# Patient Record
Sex: MALE | Race: WHITE | NOT HISPANIC OR LATINO | Employment: FULL TIME | URBAN - NONMETROPOLITAN AREA
[De-identification: names, ages, dates, MRNs, and addresses within clinical notes are randomized per-mention and may not be internally consistent; named-entity substitution may affect disease eponyms.]

---

## 2017-12-18 ENCOUNTER — CONSULT (OUTPATIENT)
Dept: GASTROENTEROLOGY | Facility: CLINIC | Age: 27
End: 2017-12-18

## 2017-12-18 ENCOUNTER — LAB (OUTPATIENT)
Dept: LAB | Facility: HOSPITAL | Age: 27
End: 2017-12-18

## 2017-12-18 ENCOUNTER — TELEPHONE (OUTPATIENT)
Dept: GASTROENTEROLOGY | Facility: CLINIC | Age: 27
End: 2017-12-18

## 2017-12-18 ENCOUNTER — HOSPITAL ENCOUNTER (OUTPATIENT)
Dept: GENERAL RADIOLOGY | Facility: HOSPITAL | Age: 27
Discharge: HOME | End: 2017-12-18
Admitting: PHYSICIAN ASSISTANT

## 2017-12-18 VITALS
SYSTOLIC BLOOD PRESSURE: 136 MMHG | OXYGEN SATURATION: 96 % | BODY MASS INDEX: 29.85 KG/M2 | DIASTOLIC BLOOD PRESSURE: 87 MMHG | HEIGHT: 74 IN | HEART RATE: 74 BPM | WEIGHT: 232.6 LBS

## 2017-12-18 DIAGNOSIS — Z87.19 HISTORY OF PANCREATITIS: ICD-10-CM

## 2017-12-18 DIAGNOSIS — R19.4 CHANGE IN BOWEL HABITS: ICD-10-CM

## 2017-12-18 DIAGNOSIS — R10.11 RUQ ABDOMINAL PAIN: ICD-10-CM

## 2017-12-18 DIAGNOSIS — R10.11 RUQ ABDOMINAL PAIN: Primary | ICD-10-CM

## 2017-12-18 DIAGNOSIS — R11.0 NAUSEA: ICD-10-CM

## 2017-12-18 LAB
ALBUMIN SERPL-MCNC: 4.5 G/DL (ref 3.5–5)
ALBUMIN/GLOB SERPL: 1.7 G/DL (ref 1.5–2.5)
ALP SERPL-CCNC: 41 U/L (ref 40–129)
ALT SERPL W P-5'-P-CCNC: 100 U/L (ref 10–44)
AMYLASE SERPL-CCNC: 99 U/L (ref 28–100)
ANION GAP SERPL CALCULATED.3IONS-SCNC: 4.2 MMOL/L (ref 3.6–11.2)
AST SERPL-CCNC: 31 U/L (ref 10–34)
BASOPHILS # BLD AUTO: 0.03 10*3/MM3 (ref 0–0.3)
BASOPHILS NFR BLD AUTO: 0.5 % (ref 0–2)
BILIRUB SERPL-MCNC: 0.6 MG/DL (ref 0.2–1.8)
BUN BLD-MCNC: 11 MG/DL (ref 7–21)
BUN/CREAT SERPL: 11.3 (ref 7–25)
CALCIUM SPEC-SCNC: 9.7 MG/DL (ref 7.7–10)
CHLORIDE SERPL-SCNC: 107 MMOL/L (ref 99–112)
CO2 SERPL-SCNC: 31.8 MMOL/L (ref 24.3–31.9)
CREAT BLD-MCNC: 0.97 MG/DL (ref 0.43–1.29)
CRP SERPL-MCNC: <0.5 MG/DL (ref 0–0.99)
DEPRECATED RDW RBC AUTO: 38.5 FL (ref 37–54)
EOSINOPHIL # BLD AUTO: 0.11 10*3/MM3 (ref 0–0.7)
EOSINOPHIL NFR BLD AUTO: 1.7 % (ref 0–5)
ERYTHROCYTE [DISTWIDTH] IN BLOOD BY AUTOMATED COUNT: 12.7 % (ref 11.5–14.5)
GFR SERPL CREATININE-BSD FRML MDRD: 92 ML/MIN/1.73
GLOBULIN UR ELPH-MCNC: 2.6 GM/DL
GLUCOSE BLD-MCNC: 103 MG/DL (ref 70–110)
HCT VFR BLD AUTO: 46.6 % (ref 42–52)
HGB BLD-MCNC: 16.3 G/DL (ref 14–18)
IMM GRANULOCYTES # BLD: 0.01 10*3/MM3 (ref 0–0.03)
IMM GRANULOCYTES NFR BLD: 0.2 % (ref 0–0.5)
LIPASE SERPL-CCNC: 86 U/L (ref 13–60)
LYMPHOCYTES # BLD AUTO: 1.67 10*3/MM3 (ref 1–3)
LYMPHOCYTES NFR BLD AUTO: 26.3 % (ref 21–51)
MCH RBC QN AUTO: 29.6 PG (ref 27–33)
MCHC RBC AUTO-ENTMCNC: 35 G/DL (ref 33–37)
MCV RBC AUTO: 84.6 FL (ref 80–94)
MONOCYTES # BLD AUTO: 0.56 10*3/MM3 (ref 0.1–0.9)
MONOCYTES NFR BLD AUTO: 8.8 % (ref 0–10)
NEUTROPHILS # BLD AUTO: 3.97 10*3/MM3 (ref 1.4–6.5)
NEUTROPHILS NFR BLD AUTO: 62.5 % (ref 30–70)
OSMOLALITY SERPL CALC.SUM OF ELEC: 284.6 MOSM/KG (ref 273–305)
PLATELET # BLD AUTO: 248 10*3/MM3 (ref 130–400)
PMV BLD AUTO: 10.5 FL (ref 6–10)
POTASSIUM BLD-SCNC: 4.5 MMOL/L (ref 3.5–5.3)
PROT SERPL-MCNC: 7.1 G/DL (ref 6–8)
RBC # BLD AUTO: 5.51 10*6/MM3 (ref 4.7–6.1)
SODIUM BLD-SCNC: 143 MMOL/L (ref 135–153)
WBC NRBC COR # BLD: 6.35 10*3/MM3 (ref 4.5–12.5)

## 2017-12-18 PROCEDURE — 83690 ASSAY OF LIPASE: CPT

## 2017-12-18 PROCEDURE — 80053 COMPREHEN METABOLIC PANEL: CPT

## 2017-12-18 PROCEDURE — 74020 HC XR ABDOMEN FLAT & UPRIGHT: CPT

## 2017-12-18 PROCEDURE — 82150 ASSAY OF AMYLASE: CPT

## 2017-12-18 PROCEDURE — 36415 COLL VENOUS BLD VENIPUNCTURE: CPT

## 2017-12-18 PROCEDURE — 74020 XR ABDOMEN FLAT AND UPRIGHT: CPT | Performed by: RADIOLOGY

## 2017-12-18 PROCEDURE — 99214 OFFICE O/P EST MOD 30 MIN: CPT | Performed by: PHYSICIAN ASSISTANT

## 2017-12-18 PROCEDURE — 86140 C-REACTIVE PROTEIN: CPT

## 2017-12-18 PROCEDURE — 85025 COMPLETE CBC W/AUTO DIFF WBC: CPT

## 2017-12-18 NOTE — PROGRESS NOTES
": 1990    Chief Complaint   Patient presents with   • Abdominal Pain     \"pancreatic issues\"       Kendrick Fortune is a 27 y.o. male who presents to the office today as a consultation from Dr. Kasi Grajeda for evaluation of Abdominal Pain.    History of Present Illness:  He has been having RUQ abdominal pain intermittently over the past 2 years. Sometimes, this pain will move into the RLQ. Pain is described as sharp, dull and achy at times. The pain will be very brief at times. If he pushes on the area, it will help to relieve it. Drinking even 1 beer will cause discomfort in the area. Pain and diarrhea or constipation are usually present the following day. He had pancreatitis approx 2 years ago and this was present prior to his appendectomy. He has history of daily drinking for approx 2 years about 6 years ago. He also has history of using health supplements. He no longer drinks daily. Pancreatic enzymes were reported as mildly elevated recently. He had US of abd approx 2 years ago which was normal.     Bowel movements are fluctuating. \"Never the same.\" He will have diarrhea fluctuating with constipation with several skip days between. No rectal bleeding or melena. He was asked to take Protonix daily and was only able to take it for 4 days. He had abdominal pain and diarrhea with it, so he stopped. Denies nausea and vomiting. Heartburn is present and severe if he eats. If he skips breakfast, he will have heartburn and nausea. Sometimes, he will have acid reflux into his mouth if he bends or belches. No recent weight loss.     2014 abd film:  IMPRESSION-   1.  There is a moderate to large amount of stool in the colon.  2.  The lungs are clear.  3.  No evidence of bowel obstruction.  4.  No free air.      US gb 2014:  IMPRESSION-    Gallbladder contracted. No evidence of cholelithiasis.    Review of Systems   Constitutional: Negative for chills, fatigue and fever.   HENT: Negative for congestion, sore " throat, trouble swallowing and voice change.    Eyes: Negative for pain and visual disturbance.   Respiratory: Negative for cough, shortness of breath and wheezing.    Cardiovascular: Negative for chest pain, palpitations and leg swelling.   Gastrointestinal: Positive for abdominal distention, abdominal pain, constipation and diarrhea. Negative for anal bleeding, blood in stool, nausea, rectal pain and vomiting.   Endocrine: Negative for cold intolerance and heat intolerance.   Genitourinary: Negative for difficulty urinating and frequency.   Musculoskeletal: Negative for arthralgias, back pain and myalgias.   Skin: Negative for rash and wound.   Allergic/Immunologic: Positive for environmental allergies. Negative for food allergies.   Neurological: Negative for dizziness and headaches.   Hematological: Does not bruise/bleed easily.   Psychiatric/Behavioral: Positive for sleep disturbance. The patient is not nervous/anxious.        Past Medical History:   Diagnosis Date   • Pancreatitis        Past Surgical History:   Procedure Laterality Date   • ADENOIDECTOMY     • APPENDECTOMY     • COLONOSCOPY      age 10- normal   • UPPER GASTROINTESTINAL ENDOSCOPY      age 10- normal       Family History   Problem Relation Age of Onset   • Cancer Other    • Ovarian cancer Mother    • No Known Problems Father    • No Known Problems Sister    • Asthma Brother        Social History     Social History   • Marital status: Single     Spouse name: N/A   • Number of children: N/A   • Years of education: N/A     Social History Main Topics   • Smoking status: Current Every Day Smoker     Types: Electronic Cigarette   • Smokeless tobacco: Never Used   • Alcohol use Yes      Comment: occasional   • Drug use: Defer   • Sexual activity: Not Asked     Other Topics Concern   • None     Social History Narrative   • None     Medications:  None    Allergies:   Review of patient's allergies indicates no known allergies.    Vitals:  /87   "Pulse 74  Ht 188 cm (74\")  Wt 106 kg (232 lb 9.6 oz)  SpO2 96%  BMI 29.86 kg/m2    Physical Exam   Constitutional: He is oriented to person, place, and time. He appears well-developed and well-nourished. No distress.   HENT:   Head: Normocephalic and atraumatic.   Right Ear: External ear normal.   Left Ear: External ear normal.   Nose: Nose normal.   Mouth/Throat: Oropharynx is clear and moist.   Eyes: Conjunctivae and EOM are normal. Right eye exhibits no discharge. Left eye exhibits no discharge. No scleral icterus.   Neck: Normal range of motion. Neck supple.   Cardiovascular: Normal rate, regular rhythm and normal heart sounds.  Exam reveals no gallop and no friction rub.    No murmur heard.  Pulmonary/Chest: Effort normal and breath sounds normal. No respiratory distress. He has no wheezes. He has no rales. He exhibits no tenderness.   Abdominal: Soft. Normal appearance and bowel sounds are normal. He exhibits no distension, no ascites and no mass. There is tenderness (mild RUQ). There is no rigidity and no guarding. No hernia.   Musculoskeletal: Normal range of motion. He exhibits no edema or deformity.   Neurological: He is alert and oriented to person, place, and time. He exhibits normal muscle tone. Coordination normal.   Skin: Skin is warm and dry. No rash noted. No erythema. No pallor.   Psychiatric: He has a normal mood and affect. His behavior is normal. Judgment and thought content normal.   Nursing note and vitals reviewed.      Assessment/Plan:  1. RUQ abdominal pain    2. Change in bowel habits    3. History of pancreatitis    4. Nausea        Orders Placed This Encounter   Procedures   • XR Abdomen Flat & Upright   • US Abdomen Complete   • Amylase   • Comprehensive Metabolic Panel   • C-reactive Protein   • Lipase   • CBC & Differential     After careful review of previous evaluation for abdominal pain, it is not certain that he had true pancreatitis as mentioned in his history. Pancreatic " elevations have been present but not in the usual trend of pancreatitis and there has not been any imaging to support this diagnosis yet. He does have symptoms when he has any amount of alcohol, but those could be due to other problems, ie ulcers. He will have the aforementioned tests and further recommendations will be made based on those results.     He will need an esophagogastroduodenoscopy performed with IV general sedation. All of the risks, benefits and alternatives of this procedure have been discussed with him, all of his questions have been answered and he has elected to proceed. He should follow up in the office after this procedure to discuss the results and further recommendations can be made at that time.        Return in about 2 weeks (around 1/1/2018) for recheck abdominal pain.      Electronically signed 12/18/2017 9:43 PM  Jesusita Rodriguez PA-C, [facility]

## 2017-12-18 NOTE — TELEPHONE ENCOUNTER
Gypsy spoke with patients PCP office to get passport specialty referral. They stated that theyw ould fax it over. They faxed over the referral but not the specialty referral. I called back and requested it again. They stated that they would fax it over.

## 2017-12-19 PROBLEM — R19.4 CHANGE IN BOWEL HABITS: Status: ACTIVE | Noted: 2017-12-19

## 2017-12-19 PROBLEM — R10.11 RUQ ABDOMINAL PAIN: Status: ACTIVE | Noted: 2017-12-19

## 2017-12-19 PROBLEM — R11.0 NAUSEA: Status: ACTIVE | Noted: 2017-12-19

## 2017-12-19 PROBLEM — Z87.19 HISTORY OF PANCREATITIS: Status: ACTIVE | Noted: 2017-12-19

## 2017-12-21 ENCOUNTER — TELEPHONE (OUTPATIENT)
Dept: GASTROENTEROLOGY | Facility: CLINIC | Age: 27
End: 2017-12-21

## 2017-12-22 ENCOUNTER — HOSPITAL ENCOUNTER (OUTPATIENT)
Dept: ULTRASOUND IMAGING | Facility: HOSPITAL | Age: 27
Discharge: HOME | End: 2017-12-22
Admitting: PHYSICIAN ASSISTANT

## 2017-12-22 DIAGNOSIS — Z87.19 HISTORY OF PANCREATITIS: ICD-10-CM

## 2017-12-22 DIAGNOSIS — R11.0 NAUSEA: ICD-10-CM

## 2017-12-22 DIAGNOSIS — R19.4 CHANGE IN BOWEL HABITS: ICD-10-CM

## 2017-12-22 DIAGNOSIS — R10.11 RUQ ABDOMINAL PAIN: ICD-10-CM

## 2017-12-22 PROCEDURE — 76700 US EXAM ABDOM COMPLETE: CPT

## 2017-12-22 PROCEDURE — 76700 US EXAM ABDOM COMPLETE: CPT | Performed by: RADIOLOGY

## 2017-12-29 ENCOUNTER — APPOINTMENT (OUTPATIENT)
Dept: ULTRASOUND IMAGING | Facility: HOSPITAL | Age: 27
End: 2017-12-29

## 2018-01-15 ENCOUNTER — OFFICE VISIT (OUTPATIENT)
Dept: GASTROENTEROLOGY | Facility: CLINIC | Age: 28
End: 2018-01-15

## 2018-01-15 VITALS
DIASTOLIC BLOOD PRESSURE: 87 MMHG | HEIGHT: 74 IN | BODY MASS INDEX: 30.26 KG/M2 | OXYGEN SATURATION: 97 % | HEART RATE: 69 BPM | WEIGHT: 235.8 LBS | SYSTOLIC BLOOD PRESSURE: 133 MMHG

## 2018-01-15 DIAGNOSIS — R19.4 CHANGE IN BOWEL HABITS: ICD-10-CM

## 2018-01-15 DIAGNOSIS — R74.01 ELEVATED ALT MEASUREMENT: Primary | ICD-10-CM

## 2018-01-15 DIAGNOSIS — R74.8 ELEVATED LIPASE: ICD-10-CM

## 2018-01-15 DIAGNOSIS — K59.00 CONSTIPATION, UNSPECIFIED CONSTIPATION TYPE: ICD-10-CM

## 2018-01-15 DIAGNOSIS — R10.11 RUQ ABDOMINAL PAIN: ICD-10-CM

## 2018-01-15 PROCEDURE — 99214 OFFICE O/P EST MOD 30 MIN: CPT | Performed by: PHYSICIAN ASSISTANT

## 2018-01-15 RX ORDER — POLYETHYLENE GLYCOL 3350 17 G/17G
17 POWDER, FOR SOLUTION ORAL EVERY OTHER DAY
Qty: 510 G | Refills: 2 | Status: SHIPPED | OUTPATIENT
Start: 2018-01-15 | End: 2018-04-17 | Stop reason: SDUPTHER

## 2018-01-15 NOTE — PROGRESS NOTES
": 1990    Chief Complaint   Patient presents with   • Abdominal Pain       Kendrick Fortune is a 27 y.o. male who presents to the office today as a follow up appointment regarding Abdominal Pain.      History of Present Illness:  Abdominal pain unchanged. Still present in the RUQ and intermittent. He feels bloated today. This abdominal pain and fluctuation of bowel habits has been ongoing for 2 years. EGD is planned in 2 weeks. Worse with italian foods and with any alcohol intake. He reports that the pain is usually the worst 2 days after ingestion of these. He has \"flare ups\" in which the diarrhea is worse. Stools are described as loose at times. He will have a bowel movement every day but it is not always a large quantity.     US abdomen complete on 2017- normal  Abdominal film on 2017- Slight increased stool volume in the right colon    Labs 2017:  Lipase 86 elev  Amylase 99 normal  CRP normal  CBC normal  CMP normal other than elev of ALT at 100    Previous History:  Daily drinking for approx 2 years about 6 years ago. He also has history of using health supplements. He no longer drinks daily. Pancreatic enzymes were elevated in the past and he was told that he had pancreatitis.      He has taken Protonix daily and was only able to take it for 4 days. He had abdominal pain and diarrhea with it, so he stopped. Denies nausea and vomiting. Heartburn is present and severe if he eats. If he skips breakfast, he will have heartburn and nausea. Sometimes, he will have acid reflux into his mouth if he bends or belches. No recent weight loss.      2014 abd film:  1.  There is a moderate to large amount of stool in the colon.  2.  The lungs are clear.  3.  No evidence of bowel obstruction.  4.  No free air.      US gb 2014- Gallbladder contracted. No evidence of cholelithiasis.    Review of Systems   Constitutional: Negative for chills, fatigue and fever.   HENT: Negative for congestion, sore " throat, trouble swallowing and voice change.    Eyes: Negative for pain and visual disturbance.   Respiratory: Negative for cough, shortness of breath and wheezing.    Cardiovascular: Negative for chest pain, palpitations and leg swelling.   Gastrointestinal: Positive for abdominal distention and abdominal pain. Negative for anal bleeding, blood in stool, constipation, diarrhea, nausea, rectal pain and vomiting.   Endocrine: Negative for cold intolerance and heat intolerance.   Genitourinary: Negative for difficulty urinating and frequency.   Musculoskeletal: Negative for arthralgias, back pain and myalgias.   Skin: Negative for rash and wound.   Allergic/Immunologic: Positive for environmental allergies. Negative for food allergies.   Neurological: Negative for dizziness and headaches.   Hematological: Does not bruise/bleed easily.   Psychiatric/Behavioral: Positive for sleep disturbance. The patient is not nervous/anxious.        Past Medical History:   Diagnosis Date   • Pancreatitis        Past Surgical History:   Procedure Laterality Date   • ADENOIDECTOMY     • APPENDECTOMY     • COLONOSCOPY      age 10- normal   • UPPER GASTROINTESTINAL ENDOSCOPY      age 10- normal       Family History   Problem Relation Age of Onset   • Cancer Other    • Ovarian cancer Mother    • No Known Problems Father    • No Known Problems Sister    • Asthma Brother        Social History     Social History   • Marital status: Single     Spouse name: N/A   • Number of children: N/A   • Years of education: N/A     Social History Main Topics   • Smoking status: Current Every Day Smoker     Types: Electronic Cigarette   • Smokeless tobacco: Never Used   • Alcohol use Yes      Comment: occasional   • Drug use: Defer   • Sexual activity: Not on file     Social History Narrative   • 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    Vitals:  /87   "Pulse 69  Ht 188 cm (74\")  Wt 107 kg (235 lb 12.8 oz)  SpO2 97%  BMI 30.27 kg/m2    Physical Exam   Constitutional: He is oriented to person, place, and time. He appears well-developed and well-nourished. No distress.   HENT:   Head: Normocephalic and atraumatic.   Right Ear: External ear normal.   Left Ear: External ear normal.   Nose: Nose normal.   Mouth/Throat: Oropharynx is clear and moist.   Eyes: Conjunctivae and EOM are normal. Right eye exhibits no discharge. Left eye exhibits no discharge. No scleral icterus.   Neck: Normal range of motion. Neck supple.   Cardiovascular: Normal rate, regular rhythm and normal heart sounds.  Exam reveals no gallop and no friction rub.    No murmur heard.  Pulmonary/Chest: Effort normal and breath sounds normal. No respiratory distress. He has no wheezes. He has no rales. He exhibits no tenderness.   Abdominal: Soft. Normal appearance and bowel sounds are normal. He exhibits no distension, no ascites and no mass. There is tenderness (bilateral lower quadrants, mild). There is no rigidity and no guarding. No hernia.   Musculoskeletal: Normal range of motion. He exhibits no edema or deformity.   Neurological: He is alert and oriented to person, place, and time. He exhibits normal muscle tone. Coordination normal.   Skin: Skin is warm and dry. No rash noted. No erythema. No pallor.   Psychiatric: He has a normal mood and affect. His behavior is normal. Judgment and thought content normal.   Nursing note and vitals reviewed.      Assessment/Plan:  1. Elevated ALT measurement    2. Elevated lipase    3. RUQ abdominal pain    4. Change in bowel habits    5. Constipation, unspecified constipation type      Orders Placed This Encounter   Procedures   • NM HIDA Scan With Pharmacological Intervention     New Medications Ordered This Visit   Medications   • magnesium citrate solution     Sig: Take 296 mL by mouth Take As Directed. Take 1 bottle now than 2nd bottle approx 6 hours " after for clean out.     Dispense:  592 mL     Refill:  0   • polyethylene glycol (MIRALAX) powder     Sig: Take 17 g by mouth Every Other Day.     Dispense:  510 g     Refill:  2     EGD already scheduled for 2/2018 with Dr. Ashraf. He will have HIDA scan due to complaints as well. With right sided constipation noted on abdominal film and continuous RUQ abdominal pain, I have recommended clean out with magnesium citrate then begin Miralax 17 g every other day. He agrees. All results were discussed at today's appointment. Lipase mildly elevated, ALT at 100. US abd normal. No specific etiology of lab abnormalities known at this time.         Return for follow up after procedure to discuss results.      Electronically signed 1/15/2018 12:30 PM  Jesusita Rodriguez PA-C, [facility]

## 2018-01-18 ENCOUNTER — HOSPITAL ENCOUNTER (OUTPATIENT)
Dept: NUCLEAR MEDICINE | Facility: HOSPITAL | Age: 28
Discharge: HOME | End: 2018-01-18

## 2018-01-18 ENCOUNTER — TELEPHONE (OUTPATIENT)
Dept: GASTROENTEROLOGY | Facility: CLINIC | Age: 28
End: 2018-01-18

## 2018-01-18 DIAGNOSIS — R10.11 RUQ ABDOMINAL PAIN: ICD-10-CM

## 2018-01-18 DIAGNOSIS — R19.4 CHANGE IN BOWEL HABITS: ICD-10-CM

## 2018-01-18 PROCEDURE — 0 TECHNETIUM TC 99M MEBROFENIN KIT: Performed by: PHYSICIAN ASSISTANT

## 2018-01-18 PROCEDURE — 25010000002 SINCALIDE PER 5 MCG: Performed by: PHYSICIAN ASSISTANT

## 2018-01-18 PROCEDURE — 78227 HEPATOBIL SYST IMAGE W/DRUG: CPT | Performed by: RADIOLOGY

## 2018-01-18 PROCEDURE — 78227 HEPATOBIL SYST IMAGE W/DRUG: CPT

## 2018-01-18 PROCEDURE — A9537 TC99M MEBROFENIN: HCPCS | Performed by: PHYSICIAN ASSISTANT

## 2018-01-18 RX ORDER — KIT FOR THE PREPARATION OF TECHNETIUM TC 99M MEBROFENIN 45 MG/10ML
1 INJECTION, POWDER, LYOPHILIZED, FOR SOLUTION INTRAVENOUS
Status: COMPLETED | OUTPATIENT
Start: 2018-01-18 | End: 2018-01-18

## 2018-01-18 RX ADMIN — SINCALIDE 4.3 MCG: 5 INJECTION, POWDER, LYOPHILIZED, FOR SOLUTION INTRAVENOUS at 10:00

## 2018-01-18 RX ADMIN — MEBROFENIN 1 DOSE: 45 INJECTION, POWDER, LYOPHILIZED, FOR SOLUTION INTRAVENOUS at 09:15

## 2018-01-31 ENCOUNTER — TELEPHONE (OUTPATIENT)
Dept: GASTROENTEROLOGY | Facility: CLINIC | Age: 28
End: 2018-01-31

## 2018-01-31 NOTE — TELEPHONE ENCOUNTER
[employer] insurance sent a letter (scanned into chart) saying that they were returning a claim because the group ID was missing or not in the correct format. I called PCP office and spoke with Marino. He stated that he would sent an updated specialty referral.

## 2018-02-01 ENCOUNTER — ANESTHESIA (OUTPATIENT)
Dept: PERIOP | Facility: HOSPITAL | Age: 28
End: 2018-02-01

## 2018-02-01 ENCOUNTER — TELEPHONE (OUTPATIENT)
Dept: GASTROENTEROLOGY | Facility: CLINIC | Age: 28
End: 2018-02-01

## 2018-02-01 ENCOUNTER — ANESTHESIA EVENT (OUTPATIENT)
Dept: PERIOP | Facility: HOSPITAL | Age: 28
End: 2018-02-01

## 2018-02-01 ENCOUNTER — HOSPITAL ENCOUNTER (OUTPATIENT)
Facility: HOSPITAL | Age: 28
Setting detail: HOSPITAL OUTPATIENT SURGERY
Discharge: HOME | End: 2018-02-01
Attending: INTERNAL MEDICINE | Admitting: INTERNAL MEDICINE

## 2018-02-01 VITALS
SYSTOLIC BLOOD PRESSURE: 123 MMHG | OXYGEN SATURATION: 98 % | BODY MASS INDEX: 29.77 KG/M2 | RESPIRATION RATE: 20 BRPM | WEIGHT: 232 LBS | DIASTOLIC BLOOD PRESSURE: 81 MMHG | HEIGHT: 74 IN | HEART RATE: 69 BPM | TEMPERATURE: 98 F

## 2018-02-01 DIAGNOSIS — R19.4 CHANGE IN BOWEL HABITS: ICD-10-CM

## 2018-02-01 DIAGNOSIS — R74.8 ELEVATED LIVER ENZYMES: Primary | ICD-10-CM

## 2018-02-01 DIAGNOSIS — R11.0 NAUSEA: ICD-10-CM

## 2018-02-01 DIAGNOSIS — R10.11 RUQ ABDOMINAL PAIN: ICD-10-CM

## 2018-02-01 DIAGNOSIS — Z87.19 HISTORY OF PANCREATITIS: ICD-10-CM

## 2018-02-01 LAB
ALBUMIN SERPL-MCNC: 4.6 G/DL (ref 3.5–5)
ALP SERPL-CCNC: 34 U/L (ref 40–129)
ALT SERPL W P-5'-P-CCNC: 88 U/L (ref 10–44)
AST SERPL-CCNC: 34 U/L (ref 10–34)
BILIRUB CONJ SERPL-MCNC: 0.3 MG/DL (ref 0–0.2)
BILIRUB INDIRECT SERPL-MCNC: 0.7 MG/DL
BILIRUB SERPL-MCNC: 1 MG/DL (ref 0.2–1.8)
PROT SERPL-MCNC: 7 G/DL (ref 6–8)

## 2018-02-01 PROCEDURE — 43239 EGD BIOPSY SINGLE/MULTIPLE: CPT | Performed by: INTERNAL MEDICINE

## 2018-02-01 PROCEDURE — 25010000002 FENTANYL CITRATE (PF) 100 MCG/2ML SOLUTION: Performed by: NURSE ANESTHETIST, CERTIFIED REGISTERED

## 2018-02-01 PROCEDURE — 80076 HEPATIC FUNCTION PANEL: CPT | Performed by: INTERNAL MEDICINE

## 2018-02-01 PROCEDURE — 25010000002 MIDAZOLAM PER 1 MG: Performed by: NURSE ANESTHETIST, CERTIFIED REGISTERED

## 2018-02-01 PROCEDURE — 25010000002 PROPOFOL 10 MG/ML EMULSION: Performed by: NURSE ANESTHETIST, CERTIFIED REGISTERED

## 2018-02-01 RX ORDER — ONDANSETRON 2 MG/ML
4 INJECTION INTRAMUSCULAR; INTRAVENOUS ONCE AS NEEDED
Status: DISCONTINUED | OUTPATIENT
Start: 2018-02-01 | End: 2018-02-01 | Stop reason: HOSPADM

## 2018-02-01 RX ORDER — FENTANYL CITRATE 50 UG/ML
50 INJECTION, SOLUTION INTRAMUSCULAR; INTRAVENOUS
Status: DISCONTINUED | OUTPATIENT
Start: 2018-02-01 | End: 2018-02-01 | Stop reason: HOSPADM

## 2018-02-01 RX ORDER — IPRATROPIUM BROMIDE AND ALBUTEROL SULFATE 2.5; .5 MG/3ML; MG/3ML
3 SOLUTION RESPIRATORY (INHALATION) ONCE AS NEEDED
Status: DISCONTINUED | OUTPATIENT
Start: 2018-02-01 | End: 2018-02-01 | Stop reason: HOSPADM

## 2018-02-01 RX ORDER — SODIUM CHLORIDE, SODIUM LACTATE, POTASSIUM CHLORIDE, CALCIUM CHLORIDE 600; 310; 30; 20 MG/100ML; MG/100ML; MG/100ML; MG/100ML
125 INJECTION, SOLUTION INTRAVENOUS CONTINUOUS
Status: DISCONTINUED | OUTPATIENT
Start: 2018-02-01 | End: 2018-02-01 | Stop reason: HOSPADM

## 2018-02-01 RX ORDER — SODIUM CHLORIDE 0.9 % (FLUSH) 0.9 %
1-10 SYRINGE (ML) INJECTION AS NEEDED
Status: DISCONTINUED | OUTPATIENT
Start: 2018-02-01 | End: 2018-02-01 | Stop reason: HOSPADM

## 2018-02-01 RX ORDER — PROPOFOL 10 MG/ML
VIAL (ML) INTRAVENOUS AS NEEDED
Status: DISCONTINUED | OUTPATIENT
Start: 2018-02-01 | End: 2018-02-01 | Stop reason: SURG

## 2018-02-01 RX ORDER — LIDOCAINE HYDROCHLORIDE 20 MG/ML
INJECTION, SOLUTION INFILTRATION; PERINEURAL AS NEEDED
Status: DISCONTINUED | OUTPATIENT
Start: 2018-02-01 | End: 2018-02-01 | Stop reason: SURG

## 2018-02-01 RX ORDER — MIDAZOLAM HYDROCHLORIDE 1 MG/ML
INJECTION INTRAMUSCULAR; INTRAVENOUS AS NEEDED
Status: DISCONTINUED | OUTPATIENT
Start: 2018-02-01 | End: 2018-02-01 | Stop reason: SURG

## 2018-02-01 RX ORDER — FENTANYL CITRATE 50 UG/ML
INJECTION, SOLUTION INTRAMUSCULAR; INTRAVENOUS AS NEEDED
Status: DISCONTINUED | OUTPATIENT
Start: 2018-02-01 | End: 2018-02-01 | Stop reason: SURG

## 2018-02-01 RX ADMIN — SODIUM CHLORIDE, POTASSIUM CHLORIDE, SODIUM LACTATE AND CALCIUM CHLORIDE: 600; 310; 30; 20 INJECTION, SOLUTION INTRAVENOUS at 10:17

## 2018-02-01 RX ADMIN — PROPOFOL 120 MG: 10 INJECTION, EMULSION INTRAVENOUS at 10:23

## 2018-02-01 RX ADMIN — MIDAZOLAM HYDROCHLORIDE 2 MG: 1 INJECTION, SOLUTION INTRAMUSCULAR; INTRAVENOUS at 10:16

## 2018-02-01 RX ADMIN — LIDOCAINE HYDROCHLORIDE 100 MG: 20 INJECTION, SOLUTION INFILTRATION; PERINEURAL at 10:23

## 2018-02-01 RX ADMIN — FENTANYL CITRATE 100 MCG: 50 INJECTION INTRAMUSCULAR; INTRAVENOUS at 10:16

## 2018-02-01 RX ADMIN — PROPOFOL 60 MG: 10 INJECTION, EMULSION INTRAVENOUS at 10:25

## 2018-02-01 NOTE — ANESTHESIA PREPROCEDURE EVALUATION
Anesthesia Evaluation     Patient summary reviewed and Nursing notes reviewed   no history of anesthetic complications:  NPO Solid Status: > 8 hours  NPO Liquid Status: > 8 hours     Airway   Mallampati: II  TM distance: >3 FB  Neck ROM: full  no difficulty expected  Dental - normal exam     Pulmonary - negative pulmonary ROS and normal exam   Cardiovascular - negative cardio ROS and normal exam        Neuro/Psych- negative ROS  GI/Hepatic/Renal/Endo - negative ROS     Musculoskeletal (-) negative ROS    Abdominal  - normal exam   Substance History - negative use     OB/GYN negative ob/gyn ROS         Other                                                Anesthesia Plan    ASA 1     general     intravenous induction   Anesthetic plan and risks discussed with patient.

## 2018-02-01 NOTE — ANESTHESIA POSTPROCEDURE EVALUATION
Patient: Kendrick Fortune    Procedure Summary     Date Anesthesia Start Anesthesia Stop Room / Location    02/01/18 1017 1030 [facility] 10 / [facility]       Procedure Diagnosis Surgeon Provider    ESOPHAGOGASTRODUODENOSCOPY WITH BIOPSY CPT CODE: 53836 (N/A Esophagus) Nausea; RUQ abdominal pain; History of pancreatitis; Change in bowel habits  (Nausea [R11.0]; RUQ abdominal pain [R10.11]; History of pancreatitis [Z87.19]; Change in bowel habits [R19.4]) MD Momo Mcdermott III, MD          Anesthesia Type: general  Last vitals  BP   134/85 (02/01/18 0901)   Temp   97.1 °F (36.2 °C) (02/01/18 0901)   Pulse   80 (02/01/18 0901)   Resp   20 (02/01/18 0901)     SpO2   100 % (02/01/18 0901)     Post Anesthesia Care and Evaluation    Patient location during evaluation: PHASE II  Patient participation: complete - patient participated  Level of consciousness: awake and alert  Pain score: 0  Pain management: adequate  Airway patency: patent  Anesthetic complications: No anesthetic complications  PONV Status: controlled  Cardiovascular status: acceptable  Respiratory status: acceptable and room air  Hydration status: euvolemic  No anesthesia care post op

## 2018-02-01 NOTE — OP NOTE
02/01/18    ESOPHAGOGASTRODUODENOSCOPY WITH BIOPSY  Procedure Note    Kendrick Fortune  2/1/2018    Pre-op Diagnosis: RUQ pain, bloating      Post-op Diagnosis: Normal EGD        Anesthesia: Per Anesthesia service, general anesthesia      Estimated Blood Loss: Negligible      Findings: EGD was performed with careful examination of the esophagus, stomach and duodenum to the fourth portion.  All areas appeared normal.  Biopsies were taken randomly from the gastric antrum in order to check for H. pylori.  Duodenal biopsies were obtained in order to screen for occult small bowel mucosal disease.  He tolerated the procedure very well and no complications were encountered.  He left the OR in stable and satisfactory condition.      Complications: None      Recommendations:   Findings were discussed with the patient  Await biopsy findings      Jeovany Ashraf III, MD     Date: 2/1/2018  Time: 10:34 AM     CC-Dr. Kasi Grajeda

## 2018-02-01 NOTE — H&P (VIEW-ONLY)
": 1990    Chief Complaint   Patient presents with   • Abdominal Pain       Kendrick Fortune is a 27 y.o. male who presents to the office today as a follow up appointment regarding Abdominal Pain.      History of Present Illness:  Abdominal pain unchanged. Still present in the RUQ and intermittent. He feels bloated today. This abdominal pain and fluctuation of bowel habits has been ongoing for 2 years. EGD is planned in 2 weeks. Worse with italian foods and with any alcohol intake. He reports that the pain is usually the worst 2 days after ingestion of these. He has \"flare ups\" in which the diarrhea is worse. Stools are described as loose at times. He will have a bowel movement every day but it is not always a large quantity.     US abdomen complete on 2017- normal  Abdominal film on 2017- Slight increased stool volume in the right colon    Labs 2017:  Lipase 86 elev  Amylase 99 normal  CRP normal  CBC normal  CMP normal other than elev of ALT at 100    Previous History:  Daily drinking for approx 2 years about 6 years ago. He also has history of using health supplements. He no longer drinks daily. Pancreatic enzymes were elevated in the past and he was told that he had pancreatitis.      He has taken Protonix daily and was only able to take it for 4 days. He had abdominal pain and diarrhea with it, so he stopped. Denies nausea and vomiting. Heartburn is present and severe if he eats. If he skips breakfast, he will have heartburn and nausea. Sometimes, he will have acid reflux into his mouth if he bends or belches. No recent weight loss.      2014 abd film:  1.  There is a moderate to large amount of stool in the colon.  2.  The lungs are clear.  3.  No evidence of bowel obstruction.  4.  No free air.      US gb 2014- Gallbladder contracted. No evidence of cholelithiasis.    Review of Systems   Constitutional: Negative for chills, fatigue and fever.   HENT: Negative for congestion, sore " throat, trouble swallowing and voice change.    Eyes: Negative for pain and visual disturbance.   Respiratory: Negative for cough, shortness of breath and wheezing.    Cardiovascular: Negative for chest pain, palpitations and leg swelling.   Gastrointestinal: Positive for abdominal distention and abdominal pain. Negative for anal bleeding, blood in stool, constipation, diarrhea, nausea, rectal pain and vomiting.   Endocrine: Negative for cold intolerance and heat intolerance.   Genitourinary: Negative for difficulty urinating and frequency.   Musculoskeletal: Negative for arthralgias, back pain and myalgias.   Skin: Negative for rash and wound.   Allergic/Immunologic: Positive for environmental allergies. Negative for food allergies.   Neurological: Negative for dizziness and headaches.   Hematological: Does not bruise/bleed easily.   Psychiatric/Behavioral: Positive for sleep disturbance. The patient is not nervous/anxious.        Past Medical History:   Diagnosis Date   • Pancreatitis        Past Surgical History:   Procedure Laterality Date   • ADENOIDECTOMY     • APPENDECTOMY     • COLONOSCOPY      age 10- normal   • UPPER GASTROINTESTINAL ENDOSCOPY      age 10- normal       Family History   Problem Relation Age of Onset   • Cancer Other    • Ovarian cancer Mother    • No Known Problems Father    • No Known Problems Sister    • Asthma Brother        Social History     Social History   • Marital status: Single     Spouse name: N/A   • Number of children: N/A   • Years of education: N/A     Social History Main Topics   • Smoking status: Current Every Day Smoker     Types: Electronic Cigarette   • Smokeless tobacco: Never Used   • Alcohol use Yes      Comment: occasional   • Drug use: Defer   • Sexual activity: Not on file     Social History Narrative   • 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    Vitals:  /87   "Pulse 69  Ht 188 cm (74\")  Wt 107 kg (235 lb 12.8 oz)  SpO2 97%  BMI 30.27 kg/m2    Physical Exam   Constitutional: He is oriented to person, place, and time. He appears well-developed and well-nourished. No distress.   HENT:   Head: Normocephalic and atraumatic.   Right Ear: External ear normal.   Left Ear: External ear normal.   Nose: Nose normal.   Mouth/Throat: Oropharynx is clear and moist.   Eyes: Conjunctivae and EOM are normal. Right eye exhibits no discharge. Left eye exhibits no discharge. No scleral icterus.   Neck: Normal range of motion. Neck supple.   Cardiovascular: Normal rate, regular rhythm and normal heart sounds.  Exam reveals no gallop and no friction rub.    No murmur heard.  Pulmonary/Chest: Effort normal and breath sounds normal. No respiratory distress. He has no wheezes. He has no rales. He exhibits no tenderness.   Abdominal: Soft. Normal appearance and bowel sounds are normal. He exhibits no distension, no ascites and no mass. There is tenderness (bilateral lower quadrants, mild). There is no rigidity and no guarding. No hernia.   Musculoskeletal: Normal range of motion. He exhibits no edema or deformity.   Neurological: He is alert and oriented to person, place, and time. He exhibits normal muscle tone. Coordination normal.   Skin: Skin is warm and dry. No rash noted. No erythema. No pallor.   Psychiatric: He has a normal mood and affect. His behavior is normal. Judgment and thought content normal.   Nursing note and vitals reviewed.      Assessment/Plan:  1. Elevated ALT measurement    2. Elevated lipase    3. RUQ abdominal pain    4. Change in bowel habits    5. Constipation, unspecified constipation type      Orders Placed This Encounter   Procedures   • NM HIDA Scan With Pharmacological Intervention     New Medications Ordered This Visit   Medications   • magnesium citrate solution     Sig: Take 296 mL by mouth Take As Directed. Take 1 bottle now than 2nd bottle approx 6 hours " after for clean out.     Dispense:  592 mL     Refill:  0   • polyethylene glycol (MIRALAX) powder     Sig: Take 17 g by mouth Every Other Day.     Dispense:  510 g     Refill:  2     EGD already scheduled for 2/2018 with Dr. Ashraf. He will have HIDA scan due to complaints as well. With right sided constipation noted on abdominal film and continuous RUQ abdominal pain, I have recommended clean out with magnesium citrate then begin Miralax 17 g every other day. He agrees. All results were discussed at today's appointment. Lipase mildly elevated, ALT at 100. US abd normal. No specific etiology of lab abnormalities known at this time.         Return for follow up after procedure to discuss results.      Electronically signed 1/15/2018 12:30 PM  Jesusita Rodriguez PA-C, [facility]

## 2018-02-01 NOTE — PLAN OF CARE
Problem: GI Endoscopy (Adult)  Goal: Signs and Symptoms of Listed Potential Problems Will be Absent or Manageable (GI Endoscopy)  Outcome: Ongoing (interventions implemented as appropriate)         (3) no apparent problem

## 2018-02-02 LAB
LAB AP CASE REPORT: NORMAL
Lab: NORMAL
PATH REPORT.FINAL DX SPEC: NORMAL

## 2018-02-05 ENCOUNTER — APPOINTMENT (OUTPATIENT)
Dept: LAB | Facility: HOSPITAL | Age: 28
End: 2018-02-05

## 2018-02-05 LAB
HAV IGM SERPL QL IA: NORMAL
HBV CORE IGM SERPL QL IA: NORMAL
HBV SURFACE AG SERPL QL IA: NORMAL
HCV AB SER DONR QL: NORMAL

## 2018-02-05 PROCEDURE — 36415 COLL VENOUS BLD VENIPUNCTURE: CPT | Performed by: INTERNAL MEDICINE

## 2018-02-05 PROCEDURE — 80074 ACUTE HEPATITIS PANEL: CPT | Performed by: INTERNAL MEDICINE

## 2018-02-06 ENCOUNTER — LAB (OUTPATIENT)
Dept: LAB | Facility: HOSPITAL | Age: 28
End: 2018-02-06

## 2018-02-06 ENCOUNTER — TELEPHONE (OUTPATIENT)
Dept: GASTROENTEROLOGY | Facility: CLINIC | Age: 28
End: 2018-02-06

## 2018-02-06 DIAGNOSIS — R74.01 ELEVATED ALT MEASUREMENT: Primary | ICD-10-CM

## 2018-02-06 DIAGNOSIS — R74.01 ELEVATED ALT MEASUREMENT: ICD-10-CM

## 2018-02-06 LAB
FERRITIN SERPL-MCNC: 313 NG/ML (ref 21.9–321.7)
IRON 24H UR-MRATE: 117 MCG/DL (ref 53–167)
IRON SATN MFR SERPL: 36 % (ref 20–50)
TIBC SERPL-MCNC: 326 MCG/DL (ref 241–421)

## 2018-02-06 PROCEDURE — 83516 IMMUNOASSAY NONANTIBODY: CPT

## 2018-02-06 PROCEDURE — 83550 IRON BINDING TEST: CPT

## 2018-02-06 PROCEDURE — 36415 COLL VENOUS BLD VENIPUNCTURE: CPT

## 2018-02-06 PROCEDURE — 86708 HEPATITIS A ANTIBODY: CPT

## 2018-02-06 PROCEDURE — 86038 ANTINUCLEAR ANTIBODIES: CPT

## 2018-02-06 PROCEDURE — 82390 ASSAY OF CERULOPLASMIN: CPT

## 2018-02-06 PROCEDURE — 83540 ASSAY OF IRON: CPT

## 2018-02-06 PROCEDURE — 82728 ASSAY OF FERRITIN: CPT

## 2018-02-06 PROCEDURE — 82784 ASSAY IGA/IGD/IGG/IGM EACH: CPT

## 2018-02-06 PROCEDURE — 82103 ALPHA-1-ANTITRYPSIN TOTAL: CPT

## 2018-02-06 PROCEDURE — 82104 ALPHA-1-ANTITRYPSIN PHENO: CPT

## 2018-02-06 PROCEDURE — 86255 FLUORESCENT ANTIBODY SCREEN: CPT

## 2018-02-06 NOTE — TELEPHONE ENCOUNTER
Please let patient know that I would like him to have labs prior to his next appt, (may take 1 week for labs to come back) and to have them as soon as he can. Thanks.

## 2018-02-07 LAB
ACTIN IGG SERPL-ACNC: 3 UNITS (ref 0–19)
ANA SER QL IA: NEGATIVE
CERULOPLASMIN SERPL-MCNC: 20.6 MG/DL (ref 16–31)
DEPRECATED MITOCHONDRIA M2 IGG SER-ACNC: <20 UNITS (ref 0–20)
HAV AB SER QL IA: NEGATIVE

## 2018-02-08 LAB
ENDOMYSIUM IGA SER QL: NEGATIVE
GLIADIN PEPTIDE IGA SER-ACNC: 4 UNITS (ref 0–19)
GLIADIN PEPTIDE IGG SER-ACNC: 2 UNITS (ref 0–19)
IGA SERPL-MCNC: 159 MG/DL (ref 90–386)
TTG IGA SER-ACNC: <2 U/ML (ref 0–3)
TTG IGG SER-ACNC: <2 U/ML (ref 0–5)

## 2018-02-09 LAB
A1AT SERPL-MCNC: 125 MG/DL (ref 90–200)
PHENOTYPE: NORMAL

## 2018-02-15 ENCOUNTER — OFFICE VISIT (OUTPATIENT)
Dept: GASTROENTEROLOGY | Facility: CLINIC | Age: 28
End: 2018-02-15

## 2018-02-15 VITALS
HEIGHT: 74 IN | OXYGEN SATURATION: 98 % | BODY MASS INDEX: 29.82 KG/M2 | WEIGHT: 232.4 LBS | HEART RATE: 74 BPM | SYSTOLIC BLOOD PRESSURE: 136 MMHG | DIASTOLIC BLOOD PRESSURE: 90 MMHG

## 2018-02-15 DIAGNOSIS — R10.11 RUQ PAIN: Primary | ICD-10-CM

## 2018-02-15 DIAGNOSIS — R74.8 ELEVATED LIPASE: ICD-10-CM

## 2018-02-15 DIAGNOSIS — R74.01 ELEVATED ALANINE AMINOTRANSFERASE (ALT) LEVEL: ICD-10-CM

## 2018-02-15 DIAGNOSIS — R10.12 LUQ ABDOMINAL PAIN: ICD-10-CM

## 2018-02-15 DIAGNOSIS — K59.00 CONSTIPATION, UNSPECIFIED CONSTIPATION TYPE: ICD-10-CM

## 2018-02-15 DIAGNOSIS — R12 HEARTBURN: ICD-10-CM

## 2018-02-15 PROCEDURE — 99214 OFFICE O/P EST MOD 30 MIN: CPT | Performed by: PHYSICIAN ASSISTANT

## 2018-02-15 NOTE — PROGRESS NOTES
": 1990    Chief Complaint   Patient presents with   • HIDA scan and procedure       Kendrick Fortune is a 27 y.o. male who presents to the office today as a follow up appointment regarding abdominal pain and recent tests.    History of Present Illness:  Today, he reports no change in his previous complaints of RUQ abdominal pain. Pain was worse with bowel cleanse and seemed to \"irritate\" the area until gradually it returned to the normal aggravating dull abdominal pain. This pain sometimes radiates to the LUQ and is worse the day after ingesting any alcohol. He has not drank any alcohol in several weeks. No intake prior to last labs. He has been trying to take Miralax 17 g at lease every few days and is trying to increase water and fiber intake. He does not notice worsening of abdominal pain with any other intake of certain foods. BMs are occurring daily and no rectal bleeding. He had 2 colonoscopies which were normal when he was 10 and approx 13. Heartburn is present and occasional and not currently taking any medications for it.     HIDA 2018:  FINDINGS:   Quantification showed 38 % tracer emptying from the gallbladder.  (Normal gallbladder ejection fraction is generally considered to be  above 35%. )    IMPRESSION:  Normalresponse to fatty meal ingestion or CCK injection,  indicating Normalgallbladder motor function and is not supportive of  chronic gallbladder disease.    EGD by Dr. Ashraf on 18 which revealed no abnormalities. Pathology showed minimal chronic gastritis and no other abnormalities including negative H pylori.     Labs 18:  Alpha - 1 - Antitrypsin Phenotype- normal  Anti-Smooth Muscle Antibody Titer- normal  Celiac Comprehensive Panel- negative  Ceruloplasmin- normal at 20.6  Ferritin- normal  Hepatitis A Antibody, Total- negative  Iron Profile- normal  Mitochondrial Antibodies, M2- normal  Nuclear Antigen Antibody, IFA- negative  Hepatitis B Surface Antibody- lab canceled    Labs " 2/1/18:  Total Protein 6.0 - 8.0 g/dL 7.0   Albumin 3.50 - 5.00 g/dL 4.60   ALT (SGPT) 10 - 44 U/L 88 (H)   AST (SGOT) 10 - 34 U/L 34   Alkaline Phosphatase 40 - 129 U/L 34 (L)   Comments: Note New Reference Ranges   Total Bilirubin 0.2 - 1.8 mg/dL 1.0   Bilirubin, Direct 0.0 - 0.2 mg/dL 0.3 (H)   Bilirubin, Indirect mg/dL 0.7     Hepatitis panel negative      Previous:  US abdomen complete on 12/22/2017- normal    Abdominal film on 12/18/2017- Slight increased stool volume in the right colon     Labs 12/18/2017:  Lipase 86 elev  Amylase 99 normal  CRP normal  CBC normal  CMP normal other than elev of ALT at 100     Daily drinking for approx 2 years about 6 years ago. He also has history of using health supplements. He no longer drinks daily. Pancreatic enzymes were elevated in the past and he was told that he had pancreatitis. He has taken Protonix daily and was only able to take it for 4 days. He had abdominal pain and diarrhea with it, so he stopped. Denies nausea and vomiting. Heartburn is present and severe if he eats. If he skips breakfast, he will have heartburn and nausea. Sometimes, he will have acid reflux into his mouth if he bends or belches. No recent weight loss.       6/2014 abd film:  1.  There is a moderate to large amount of stool in the colon.  2.  The lungs are clear.  3.  No evidence of bowel obstruction.  4.  No free air.      US gb 6/2014- Gallbladder contracted. No evidence of cholelithiasis.    Review of Systems   Constitutional: Negative for chills, fatigue and fever.   HENT: Negative for congestion, sore throat, trouble swallowing and voice change.    Eyes: Negative for pain and visual disturbance.   Respiratory: Negative for cough, shortness of breath and wheezing.    Cardiovascular: Negative for chest pain, palpitations and leg swelling.   Gastrointestinal: Positive for abdominal distention and abdominal pain. Negative for anal bleeding, blood in stool, constipation, diarrhea, nausea,  "rectal pain and vomiting.   Endocrine: Negative for cold intolerance and heat intolerance.   Genitourinary: Negative for difficulty urinating and frequency.   Musculoskeletal: Negative for arthralgias, back pain and myalgias.   Skin: Negative for rash and wound.   Allergic/Immunologic: Positive for environmental allergies. Negative for food allergies.   Neurological: Negative for dizziness and headaches.   Hematological: Does not bruise/bleed easily.   Psychiatric/Behavioral: Positive for sleep disturbance. The patient is not nervous/anxious.        Past Medical History:   Diagnosis Date   • Pancreatitis        Past Surgical History:   Procedure Laterality Date   • ADENOIDECTOMY     • APPENDECTOMY     • COLONOSCOPY      age 10- normal   • ENDOSCOPY N/A 2/1/2018    Procedure: ESOPHAGOGASTRODUODENOSCOPY WITH BIOPSY CPT CODE: 98621;  Surgeon: Jeovany Ashraf III, MD;  Location: [facility];  Service:    • UPPER GASTROINTESTINAL ENDOSCOPY      age 10- normal       Family History   Problem Relation Age of Onset   • Cancer Other    • Ovarian cancer Mother    • No Known Problems Father    • No Known Problems Sister    • Asthma Brother        Social History     Social History   • Marital status: Single     Spouse name: N/A   • Number of children: N/A   • Years of education: N/A     Social History Main Topics   • Smoking status: Current Every Day Smoker     Types: Electronic Cigarette   • Smokeless tobacco: Never Used   • Alcohol use No      Comment: occasional   • Drug use: Defer   • Sexual activity: Defer     Other Topics Concern   • None     Social History Narrative       Current Outpatient Prescriptions:   •  polyethylene glycol (MIRALAX) powder, Take 17 g by mouth Every Other Day., Disp: 510 g, Rfl: 2    Allergies:   Review of patient's allergies indicates no known allergies.    Vitals:  /90  Pulse 74  Ht 188 cm (74\")  Wt 105 kg (232 lb 6.4 oz)  SpO2 98%  BMI 29.84 kg/m2    Physical Exam   Constitutional: " He is oriented to person, place, and time. He appears well-developed and well-nourished. No distress.   HENT:   Head: Normocephalic and atraumatic.   Right Ear: External ear normal.   Left Ear: External ear normal.   Nose: Nose normal.   Mouth/Throat: Oropharynx is clear and moist.   Eyes: Conjunctivae and EOM are normal. Right eye exhibits no discharge. Left eye exhibits no discharge. No scleral icterus.   Neck: Normal range of motion. Neck supple.   Cardiovascular: Normal rate, regular rhythm and normal heart sounds.  Exam reveals no gallop and no friction rub.    No murmur heard.  Pulmonary/Chest: Effort normal and breath sounds normal. No respiratory distress. He has no wheezes. He has no rales. He exhibits no tenderness.   Abdominal: Soft. Normal appearance and bowel sounds are normal. He exhibits no distension, no ascites and no mass. There is tenderness (bilateral lower quadrants, mild). There is no rigidity and no guarding. No hernia.   Musculoskeletal: Normal range of motion. He exhibits no edema or deformity.   Neurological: He is alert and oriented to person, place, and time. He exhibits normal muscle tone. Coordination normal.   Skin: Skin is warm and dry. No rash noted. No erythema. No pallor.   Psychiatric: He has a normal mood and affect. His behavior is normal. Judgment and thought content normal.   Nursing note and vitals reviewed.      Assessment/Plan:  1. RUQ pain    2. LUQ abdominal pain    3. Constipation, unspecified constipation type    4. Elevated alanine aminotransferase (ALT) level    5. Elevated lipase    6. Heartburn      Complete serological liver evaluation was normal. US abdomen was normal in 12/2017. Lipase mildly elevated. ALT elevated. RUQ abdominal pain is persistent and aggravating. Constipation is currently treated with Miralax and he will be diligent to take every other day and monitor bowel movements. HIDA scan was normal but at lower limits. Not encouraging cholecystectomy yet  but could relieve symptoms. EGD was normal. MRI abdomen has been ordered to further evaluate his complaints. After review of imaging, more labs/urine tests may be ordered. We will follow ALT and lipase due to persistent elevation.     Orders Placed This Encounter   Procedures   • MRI Abdomen With & Without Contrast         Return in about 1 month (around 3/15/2018) for discussion of results.      Electronically signed 2/21/2018 3:26 PM  Jesusita Rodriguez PA-C, [facility]

## 2018-02-27 ENCOUNTER — TELEPHONE (OUTPATIENT)
Dept: GASTROENTEROLOGY | Facility: CLINIC | Age: 28
End: 2018-02-27

## 2018-02-27 ENCOUNTER — HOSPITAL ENCOUNTER (OUTPATIENT)
Dept: MRI IMAGING | Facility: HOSPITAL | Age: 28
Discharge: HOME | End: 2018-02-27
Admitting: PHYSICIAN ASSISTANT

## 2018-02-27 DIAGNOSIS — R12 HEARTBURN: ICD-10-CM

## 2018-02-27 DIAGNOSIS — R10.11 RUQ PAIN: ICD-10-CM

## 2018-02-27 DIAGNOSIS — R74.8 ELEVATED LIPASE: ICD-10-CM

## 2018-02-27 DIAGNOSIS — K59.00 CONSTIPATION, UNSPECIFIED CONSTIPATION TYPE: ICD-10-CM

## 2018-02-27 DIAGNOSIS — R10.12 LUQ ABDOMINAL PAIN: ICD-10-CM

## 2018-02-27 DIAGNOSIS — R74.01 ELEVATED ALANINE AMINOTRANSFERASE (ALT) LEVEL: ICD-10-CM

## 2018-02-27 PROCEDURE — A9577 INJ MULTIHANCE: HCPCS | Performed by: PHYSICIAN ASSISTANT

## 2018-02-27 PROCEDURE — 0 GADOBENATE DIMEGLUMINE 529 MG/ML SOLUTION: Performed by: PHYSICIAN ASSISTANT

## 2018-02-27 PROCEDURE — 74183 MRI ABD W/O CNTR FLWD CNTR: CPT | Performed by: RADIOLOGY

## 2018-02-27 PROCEDURE — 74183 MRI ABD W/O CNTR FLWD CNTR: CPT

## 2018-02-27 RX ADMIN — GADOBENATE DIMEGLUMINE 20 ML: 529 INJECTION, SOLUTION INTRAVENOUS at 10:40

## 2018-02-27 NOTE — TELEPHONE ENCOUNTER
I called to let patient know that MRI was normal. Did show constipation, discussed high fiber diet, high water intake and daily miralax 17 g until f/u appt.

## 2018-04-17 ENCOUNTER — OFFICE VISIT (OUTPATIENT)
Dept: GASTROENTEROLOGY | Facility: CLINIC | Age: 28
End: 2018-04-17

## 2018-04-17 VITALS
BODY MASS INDEX: 30.54 KG/M2 | WEIGHT: 230.4 LBS | DIASTOLIC BLOOD PRESSURE: 93 MMHG | OXYGEN SATURATION: 97 % | SYSTOLIC BLOOD PRESSURE: 132 MMHG | HEIGHT: 73 IN | HEART RATE: 69 BPM

## 2018-04-17 DIAGNOSIS — R74.8 ELEVATED LIPASE: ICD-10-CM

## 2018-04-17 DIAGNOSIS — R74.01 ELEVATED ALT MEASUREMENT: ICD-10-CM

## 2018-04-17 DIAGNOSIS — K59.00 CONSTIPATION, UNSPECIFIED CONSTIPATION TYPE: ICD-10-CM

## 2018-04-17 DIAGNOSIS — R10.11 RUQ ABDOMINAL PAIN: Primary | ICD-10-CM

## 2018-04-17 PROCEDURE — 99214 OFFICE O/P EST MOD 30 MIN: CPT | Performed by: PHYSICIAN ASSISTANT

## 2018-04-17 RX ORDER — POLYETHYLENE GLYCOL 3350 17 G/17G
17 POWDER, FOR SOLUTION ORAL EVERY OTHER DAY
Qty: 850 G | Refills: 5 | Status: SHIPPED | OUTPATIENT
Start: 2018-04-17

## 2021-03-16 ENCOUNTER — BULK ORDERING (OUTPATIENT)
Dept: CASE MANAGEMENT | Facility: OTHER | Age: 31
End: 2021-03-16

## 2021-03-16 DIAGNOSIS — Z23 IMMUNIZATION DUE: ICD-10-CM

## 2023-08-05 ENCOUNTER — APPOINTMENT (OUTPATIENT)
Dept: GENERAL RADIOLOGY | Facility: HOSPITAL | Age: 33
End: 2023-08-05
Payer: COMMERCIAL

## 2023-08-05 ENCOUNTER — APPOINTMENT (OUTPATIENT)
Dept: CT IMAGING | Facility: HOSPITAL | Age: 33
End: 2023-08-05
Payer: COMMERCIAL

## 2023-08-05 ENCOUNTER — HOSPITAL ENCOUNTER (EMERGENCY)
Facility: HOSPITAL | Age: 33
Discharge: HOME | End: 2023-08-05
Attending: EMERGENCY MEDICINE
Payer: COMMERCIAL

## 2023-08-05 VITALS
SYSTOLIC BLOOD PRESSURE: 137 MMHG | WEIGHT: 234 LBS | TEMPERATURE: 99.6 F | BODY MASS INDEX: 30.03 KG/M2 | HEIGHT: 74 IN | DIASTOLIC BLOOD PRESSURE: 83 MMHG | OXYGEN SATURATION: 95 % | RESPIRATION RATE: 20 BRPM | HEART RATE: 92 BPM

## 2023-08-05 DIAGNOSIS — A08.4 VIRAL GASTROENTERITIS: ICD-10-CM

## 2023-08-05 DIAGNOSIS — K76.0 HEPATIC STEATOSIS: Primary | ICD-10-CM

## 2023-08-05 LAB
ALBUMIN SERPL-MCNC: 4.9 G/DL (ref 3.5–5.2)
ALBUMIN/GLOB SERPL: 2 G/DL
ALP SERPL-CCNC: 50 U/L (ref 39–117)
ALT SERPL W P-5'-P-CCNC: 118 U/L (ref 1–41)
AMPHET+METHAMPHET UR QL: NEGATIVE
AMPHETAMINES UR QL: NEGATIVE
AMYLASE SERPL-CCNC: 97 U/L (ref 28–100)
ANION GAP SERPL CALCULATED.3IONS-SCNC: 12.5 MMOL/L (ref 5–15)
AST SERPL-CCNC: 44 U/L (ref 1–40)
BARBITURATES UR QL SCN: NEGATIVE
BASOPHILS # BLD AUTO: 0.03 10*3/MM3 (ref 0–0.2)
BASOPHILS NFR BLD AUTO: 0.3 % (ref 0–1.5)
BENZODIAZ UR QL SCN: NEGATIVE
BILIRUB SERPL-MCNC: 1 MG/DL (ref 0–1.2)
BILIRUB UR QL STRIP: NEGATIVE
BUN SERPL-MCNC: 12 MG/DL (ref 6–20)
BUN/CREAT SERPL: 12.8 (ref 7–25)
BUPRENORPHINE SERPL-MCNC: NEGATIVE NG/ML
CALCIUM SPEC-SCNC: 9.6 MG/DL (ref 8.6–10.5)
CANNABINOIDS SERPL QL: NEGATIVE
CHLORIDE SERPL-SCNC: 102 MMOL/L (ref 98–107)
CLARITY UR: CLEAR
CO2 SERPL-SCNC: 23.5 MMOL/L (ref 22–29)
COCAINE UR QL: NEGATIVE
COLOR UR: YELLOW
CREAT SERPL-MCNC: 0.94 MG/DL (ref 0.76–1.27)
CRP SERPL-MCNC: 1.06 MG/DL (ref 0–0.5)
D-LACTATE SERPL-SCNC: 1 MMOL/L (ref 0.5–2)
DEPRECATED RDW RBC AUTO: 40.3 FL (ref 37–54)
EGFRCR SERPLBLD CKD-EPI 2021: 109.1 ML/MIN/1.73
EOSINOPHIL # BLD AUTO: 0.06 10*3/MM3 (ref 0–0.4)
EOSINOPHIL NFR BLD AUTO: 0.6 % (ref 0.3–6.2)
ERYTHROCYTE [DISTWIDTH] IN BLOOD BY AUTOMATED COUNT: 12.4 % (ref 12.3–15.4)
ETHANOL BLD-MCNC: <10 MG/DL (ref 0–10)
ETHANOL UR QL: <0.01 %
FENTANYL UR-MCNC: NEGATIVE NG/ML
FLUAV RNA RESP QL NAA+PROBE: NOT DETECTED
FLUBV RNA ISLT QL NAA+PROBE: NOT DETECTED
GLOBULIN UR ELPH-MCNC: 2.5 GM/DL
GLUCOSE SERPL-MCNC: 108 MG/DL (ref 65–99)
GLUCOSE UR STRIP-MCNC: NEGATIVE MG/DL
HCT VFR BLD AUTO: 44.3 % (ref 37.5–51)
HGB BLD-MCNC: 15.6 G/DL (ref 13–17.7)
HGB UR QL STRIP.AUTO: NEGATIVE
IMM GRANULOCYTES # BLD AUTO: 0.02 10*3/MM3 (ref 0–0.05)
IMM GRANULOCYTES NFR BLD AUTO: 0.2 % (ref 0–0.5)
KETONES UR QL STRIP: ABNORMAL
LEUKOCYTE ESTERASE UR QL STRIP.AUTO: NEGATIVE
LIPASE SERPL-CCNC: 78 U/L (ref 13–60)
LYMPHOCYTES # BLD AUTO: 0.67 10*3/MM3 (ref 0.7–3.1)
LYMPHOCYTES NFR BLD AUTO: 6.6 % (ref 19.6–45.3)
MCH RBC QN AUTO: 31.8 PG (ref 26.6–33)
MCHC RBC AUTO-ENTMCNC: 35.2 G/DL (ref 31.5–35.7)
MCV RBC AUTO: 90.4 FL (ref 79–97)
METHADONE UR QL SCN: NEGATIVE
MONOCYTES # BLD AUTO: 0.66 10*3/MM3 (ref 0.1–0.9)
MONOCYTES NFR BLD AUTO: 6.5 % (ref 5–12)
NEUTROPHILS NFR BLD AUTO: 8.72 10*3/MM3 (ref 1.7–7)
NEUTROPHILS NFR BLD AUTO: 85.8 % (ref 42.7–76)
NITRITE UR QL STRIP: NEGATIVE
NRBC BLD AUTO-RTO: 0 /100 WBC (ref 0–0.2)
OPIATES UR QL: POSITIVE
OXYCODONE UR QL SCN: NEGATIVE
PCP UR QL SCN: NEGATIVE
PH UR STRIP.AUTO: 5.5 [PH] (ref 5–8)
PLATELET # BLD AUTO: 197 10*3/MM3 (ref 140–450)
PMV BLD AUTO: 9.5 FL (ref 6–12)
POTASSIUM SERPL-SCNC: 3.8 MMOL/L (ref 3.5–5.2)
PROPOXYPH UR QL: NEGATIVE
PROT SERPL-MCNC: 7.4 G/DL (ref 6–8.5)
PROT UR QL STRIP: NEGATIVE
RBC # BLD AUTO: 4.9 10*6/MM3 (ref 4.14–5.8)
SARS-COV-2 RNA RESP QL NAA+PROBE: NOT DETECTED
SODIUM SERPL-SCNC: 138 MMOL/L (ref 136–145)
SP GR UR STRIP: 1.02 (ref 1–1.03)
TRICYCLICS UR QL SCN: NEGATIVE
TROPONIN T SERPL HS-MCNC: <6 NG/L
UROBILINOGEN UR QL STRIP: ABNORMAL
WBC NRBC COR # BLD: 10.16 10*3/MM3 (ref 3.4–10.8)

## 2023-08-05 PROCEDURE — 25010000002 ONDANSETRON PER 1 MG: Performed by: PHYSICIAN ASSISTANT

## 2023-08-05 PROCEDURE — 83690 ASSAY OF LIPASE: CPT | Performed by: PHYSICIAN ASSISTANT

## 2023-08-05 PROCEDURE — 82150 ASSAY OF AMYLASE: CPT | Performed by: PHYSICIAN ASSISTANT

## 2023-08-05 PROCEDURE — 80307 DRUG TEST PRSMV CHEM ANLYZR: CPT | Performed by: PHYSICIAN ASSISTANT

## 2023-08-05 PROCEDURE — 84484 ASSAY OF TROPONIN QUANT: CPT | Performed by: PHYSICIAN ASSISTANT

## 2023-08-05 PROCEDURE — 87040 BLOOD CULTURE FOR BACTERIA: CPT | Performed by: PHYSICIAN ASSISTANT

## 2023-08-05 PROCEDURE — 87636 SARSCOV2 & INF A&B AMP PRB: CPT | Performed by: PHYSICIAN ASSISTANT

## 2023-08-05 PROCEDURE — 96374 THER/PROPH/DIAG INJ IV PUSH: CPT

## 2023-08-05 PROCEDURE — 96375 TX/PRO/DX INJ NEW DRUG ADDON: CPT

## 2023-08-05 PROCEDURE — 74177 CT ABD & PELVIS W/CONTRAST: CPT

## 2023-08-05 PROCEDURE — 74177 CT ABD & PELVIS W/CONTRAST: CPT | Performed by: STUDENT IN AN ORGANIZED HEALTH CARE EDUCATION/TRAINING PROGRAM

## 2023-08-05 PROCEDURE — 85025 COMPLETE CBC W/AUTO DIFF WBC: CPT | Performed by: PHYSICIAN ASSISTANT

## 2023-08-05 PROCEDURE — 86140 C-REACTIVE PROTEIN: CPT | Performed by: PHYSICIAN ASSISTANT

## 2023-08-05 PROCEDURE — 71045 X-RAY EXAM CHEST 1 VIEW: CPT

## 2023-08-05 PROCEDURE — 71045 X-RAY EXAM CHEST 1 VIEW: CPT | Performed by: RADIOLOGY

## 2023-08-05 PROCEDURE — 25510000001 IOPAMIDOL 61 % SOLUTION: Performed by: EMERGENCY MEDICINE

## 2023-08-05 PROCEDURE — 80053 COMPREHEN METABOLIC PANEL: CPT | Performed by: PHYSICIAN ASSISTANT

## 2023-08-05 PROCEDURE — 82077 ASSAY SPEC XCP UR&BREATH IA: CPT | Performed by: PHYSICIAN ASSISTANT

## 2023-08-05 PROCEDURE — 99285 EMERGENCY DEPT VISIT HI MDM: CPT

## 2023-08-05 PROCEDURE — 81003 URINALYSIS AUTO W/O SCOPE: CPT | Performed by: PHYSICIAN ASSISTANT

## 2023-08-05 PROCEDURE — 93005 ELECTROCARDIOGRAM TRACING: CPT | Performed by: PHYSICIAN ASSISTANT

## 2023-08-05 PROCEDURE — 36415 COLL VENOUS BLD VENIPUNCTURE: CPT

## 2023-08-05 PROCEDURE — 25010000002 KETOROLAC TROMETHAMINE PER 15 MG: Performed by: PHYSICIAN ASSISTANT

## 2023-08-05 PROCEDURE — 83605 ASSAY OF LACTIC ACID: CPT | Performed by: PHYSICIAN ASSISTANT

## 2023-08-05 RX ORDER — KETOROLAC TROMETHAMINE 30 MG/ML
30 INJECTION, SOLUTION INTRAMUSCULAR; INTRAVENOUS ONCE
Status: COMPLETED | OUTPATIENT
Start: 2023-08-05 | End: 2023-08-05

## 2023-08-05 RX ORDER — ONDANSETRON 4 MG/1
4 TABLET, ORALLY DISINTEGRATING ORAL EVERY 8 HOURS PRN
Qty: 21 TABLET | Refills: 0 | Status: SHIPPED | OUTPATIENT
Start: 2023-08-05 | End: 2023-08-12

## 2023-08-05 RX ORDER — SODIUM CHLORIDE 0.9 % (FLUSH) 0.9 %
10 SYRINGE (ML) INJECTION AS NEEDED
Status: DISCONTINUED | OUTPATIENT
Start: 2023-08-05 | End: 2023-08-06 | Stop reason: HOSPADM

## 2023-08-05 RX ORDER — ONDANSETRON 2 MG/ML
4 INJECTION INTRAMUSCULAR; INTRAVENOUS ONCE
Status: COMPLETED | OUTPATIENT
Start: 2023-08-05 | End: 2023-08-05

## 2023-08-05 RX ADMIN — SODIUM CHLORIDE 1000 ML: 9 INJECTION, SOLUTION INTRAVENOUS at 21:57

## 2023-08-05 RX ADMIN — KETOROLAC TROMETHAMINE 30 MG: 30 INJECTION, SOLUTION INTRAMUSCULAR; INTRAVENOUS at 20:53

## 2023-08-05 RX ADMIN — IOPAMIDOL 80 ML: 612 INJECTION, SOLUTION INTRAVENOUS at 21:41

## 2023-08-05 RX ADMIN — ONDANSETRON 4 MG: 2 INJECTION INTRAMUSCULAR; INTRAVENOUS at 20:52

## 2023-08-06 LAB
QT INTERVAL: 362 MS
QTC INTERVAL: 445 MS

## 2023-08-06 NOTE — ED PROVIDER NOTES
Subjective   History of Present Illness  This is a 32 year old male patient who presents to the ER with chief complaint of epigastric abdominal pain. PMH of alcohol induced acute pancreatitis. He last drank alcohol last night. This morning, the patient started having constant, moderate, sharp epigastric abdominal pain. He also complains of nausea and vomiting. Denies urinary symptoms, diarrhea, constipation.     Review of Systems   Constitutional:  Positive for fever. Negative for activity change, chills, diaphoresis, fatigue and unexpected weight change.   HENT: Negative.     Respiratory: Negative.     Cardiovascular: Negative.  Negative for chest pain.   Gastrointestinal:  Positive for abdominal pain, nausea and vomiting. Negative for abdominal distention, anal bleeding, blood in stool, constipation, diarrhea and rectal pain.   Endocrine: Negative.    Genitourinary: Negative.  Negative for dysuria.   Skin: Negative.    Neurological: Negative.    Psychiatric/Behavioral: Negative.     All other systems reviewed and are negative.    Past Medical History:   Diagnosis Date    Pancreatitis        No Known Allergies    Past Surgical History:   Procedure Laterality Date    ADENOIDECTOMY      APPENDECTOMY      COLONOSCOPY      age 10- normal    ENDOSCOPY N/A 2/1/2018    Procedure: ESOPHAGOGASTRODUODENOSCOPY WITH BIOPSY CPT CODE: 35014;  Surgeon: Jeovany Ashraf III, MD;  Location: [facility];  Service:     UPPER GASTROINTESTINAL ENDOSCOPY      age 10- normal       Family History   Problem Relation Age of Onset    Cancer Other     Ovarian cancer Mother     No Known Problems Father     No Known Problems Sister     Asthma Brother        Social History     Socioeconomic History    Marital status: Single   Tobacco Use    Smoking status: Every Day     Types: Electronic Cigarette    Smokeless tobacco: Never   Substance and Sexual Activity    Alcohol use: No     Comment: occasional    Drug use: Defer    Sexual activity: Defer            Objective   Physical Exam  Vitals and nursing note reviewed.   Constitutional:       General: He is not in acute distress.     Appearance: He is well-developed. He is not diaphoretic.   HENT:      Head: Normocephalic and atraumatic.      Right Ear: External ear normal.      Left Ear: External ear normal.      Nose: Nose normal.   Eyes:      Conjunctiva/sclera: Conjunctivae normal.      Pupils: Pupils are equal, round, and reactive to light.   Neck:      Vascular: No JVD.      Trachea: No tracheal deviation.   Cardiovascular:      Rate and Rhythm: Normal rate and regular rhythm.      Heart sounds: Normal heart sounds. No murmur heard.  Pulmonary:      Effort: Pulmonary effort is normal. No respiratory distress.      Breath sounds: Normal breath sounds. No wheezing.   Abdominal:      General: Bowel sounds are normal.      Palpations: Abdomen is soft.      Tenderness: There is abdominal tenderness in the epigastric area. There is no guarding or rebound.   Musculoskeletal:         General: No deformity. Normal range of motion.      Cervical back: Normal range of motion and neck supple.   Skin:     General: Skin is warm and dry.      Coloration: Skin is not pale.      Findings: No erythema or rash.   Neurological:      Mental Status: He is alert and oriented to person, place, and time.      Cranial Nerves: No cranial nerve deficit.   Psychiatric:         Behavior: Behavior normal.         Thought Content: Thought content normal.       Procedures       Results for orders placed or performed during the hospital encounter of 08/05/23   COVID-19 and FLU A/B PCR - Swab, Nasopharynx    Specimen: Nasopharynx; Swab   Result Value Ref Range    COVID19 Not Detected Not Detected - Ref. Range    Influenza A PCR Not Detected Not Detected    Influenza B PCR Not Detected Not Detected   Comprehensive Metabolic Panel    Specimen: Arm, Right; Blood   Result Value Ref Range    Glucose 108 (H) 65 - 99 mg/dL    BUN 12 6 - 20 mg/dL     Creatinine 0.94 0.76 - 1.27 mg/dL    Sodium 138 136 - 145 mmol/L    Potassium 3.8 3.5 - 5.2 mmol/L    Chloride 102 98 - 107 mmol/L    CO2 23.5 22.0 - 29.0 mmol/L    Calcium 9.6 8.6 - 10.5 mg/dL    Total Protein 7.4 6.0 - 8.5 g/dL    Albumin 4.9 3.5 - 5.2 g/dL    ALT (SGPT) 118 (H) 1 - 41 U/L    AST (SGOT) 44 (H) 1 - 40 U/L    Alkaline Phosphatase 50 39 - 117 U/L    Total Bilirubin 1.0 0.0 - 1.2 mg/dL    Globulin 2.5 gm/dL    A/G Ratio 2.0 g/dL    BUN/Creatinine Ratio 12.8 7.0 - 25.0    Anion Gap 12.5 5.0 - 15.0 mmol/L    eGFR 109.1 >60.0 mL/min/1.73   Lipase    Specimen: Arm, Right; Blood   Result Value Ref Range    Lipase 78 (H) 13 - 60 U/L   Amylase    Specimen: Arm, Right; Blood   Result Value Ref Range    Amylase 97 28 - 100 U/L   Urinalysis With Microscopic If Indicated (No Culture) - Urine, Clean Catch    Specimen: Urine, Clean Catch   Result Value Ref Range    Color, UA Yellow Yellow, Straw    Appearance, UA Clear Clear    pH, UA 5.5 5.0 - 8.0    Specific Gravity, UA 1.021 1.005 - 1.030    Glucose, UA Negative Negative    Ketones, UA Trace (A) Negative    Bilirubin, UA Negative Negative    Blood, UA Negative Negative    Protein, UA Negative Negative    Leuk Esterase, UA Negative Negative    Nitrite, UA Negative Negative    Urobilinogen, UA 0.2 E.U./dL 0.2 - 1.0 E.U./dL   Single High Sensitivity Troponin T    Specimen: Arm, Right; Blood   Result Value Ref Range    HS Troponin T <6 <15 ng/L   C-reactive Protein    Specimen: Arm, Right; Blood   Result Value Ref Range    C-Reactive Protein 1.06 (H) 0.00 - 0.50 mg/dL   Lactic Acid, Plasma    Specimen: Arm, Right; Blood   Result Value Ref Range    Lactate 1.0 0.5 - 2.0 mmol/L   Urine Drug Screen - Urine, Clean Catch    Specimen: Urine, Clean Catch   Result Value Ref Range    THC, Screen, Urine Negative Negative    Phencyclidine (PCP), Urine Negative Negative    Cocaine Screen, Urine Negative Negative    Methamphetamine, Ur Negative Negative    Opiate Screen  Positive (A) Negative    Amphetamine Screen, Urine Negative Negative    Benzodiazepine Screen, Urine Negative Negative    Tricyclic Antidepressants Screen Negative Negative    Methadone Screen, Urine Negative Negative    Barbiturates Screen, Urine Negative Negative    Oxycodone Screen, Urine Negative Negative    Propoxyphene Screen Negative Negative    Buprenorphine, Screen, Urine Negative Negative   Ethanol    Specimen: Arm, Right; Blood   Result Value Ref Range    Ethanol <10 0 - 10 mg/dL    Ethanol % <0.010 %   CBC Auto Differential    Specimen: Arm, Right; Blood   Result Value Ref Range    WBC 10.16 3.40 - 10.80 10*3/mm3    RBC 4.90 4.14 - 5.80 10*6/mm3    Hemoglobin 15.6 13.0 - 17.7 g/dL    Hematocrit 44.3 37.5 - 51.0 %    MCV 90.4 79.0 - 97.0 fL    MCH 31.8 26.6 - 33.0 pg    MCHC 35.2 31.5 - 35.7 g/dL    RDW 12.4 12.3 - 15.4 %    RDW-SD 40.3 37.0 - 54.0 fl    MPV 9.5 6.0 - 12.0 fL    Platelets 197 140 - 450 10*3/mm3    Neutrophil % 85.8 (H) 42.7 - 76.0 %    Lymphocyte % 6.6 (L) 19.6 - 45.3 %    Monocyte % 6.5 5.0 - 12.0 %    Eosinophil % 0.6 0.3 - 6.2 %    Basophil % 0.3 0.0 - 1.5 %    Immature Grans % 0.2 0.0 - 0.5 %    Neutrophils, Absolute 8.72 (H) 1.70 - 7.00 10*3/mm3    Lymphocytes, Absolute 0.67 (L) 0.70 - 3.10 10*3/mm3    Monocytes, Absolute 0.66 0.10 - 0.90 10*3/mm3    Eosinophils, Absolute 0.06 0.00 - 0.40 10*3/mm3    Basophils, Absolute 0.03 0.00 - 0.20 10*3/mm3    Immature Grans, Absolute 0.02 0.00 - 0.05 10*3/mm3    nRBC 0.0 0.0 - 0.2 /100 WBC   Fentanyl, Urine - Urine, Clean Catch    Specimen: Urine, Clean Catch   Result Value Ref Range    Fentanyl, Urine Negative Negative   ECG 12 Lead Chest Pain   Result Value Ref Range    QT Interval 362 ms    QTC Interval 445 ms          ED Course  ED Course as of 08/05/23 2302   Sat Aug 05, 2023   2209 ECG 12 Lead Chest Pain  Vent. Rate :  91 BPM     Atrial Rate :  91 BPM     P-R Int : 152 ms          QRS Dur :  94 ms      QT Int : 362 ms       P-R-T Axes :   44   6  29 degrees     QTc Int : 445 ms     Normal sinus rhythm  Normal ECG  No previous ECGs available   [ES]   2256 XR Chest 1 View  IMPRESSION:     No evidence of acute disease in the chest.     This report was finalized on 8/5/2023 10:49 PM by Isaac Yeung MD   [MM]   4976 CT Abdomen Pelvis With Contrast  IMPRESSION:     Hepatic steatosis, in the setting of acute abdominal pain findings could  represent acute on chronic steatohepatitis.  Recommend correlation with  liver function testing.           This report was finalized on 8/5/2023 10:45 PM by Cesar Pineda MD   [MM]   2300 Patient diagnosed with hepatic steatosis and viral gastroenteritis. Will be d/c home with rx for zofran and instructions for bland diet and increased fluids. Will f/u with PCP in 2 days or return to ER if symptoms worsen.  [MM]      ED Course User Index  [ES] Alexis Choi MD  [MM] Opal Loera PA                                           Medical Decision Making    This is a 32 year old male patient who presents to the ER with chief complaint of epigastric abdominal pain. PMH of alcohol induced acute pancreatitis. He last drank alcohol last night. This morning, the patient started having constant, moderate, sharp epigastric abdominal pain. He also complains of nausea and vomiting. Denies urinary symptoms, fdiarrhea, constipation.     Amount and/or Complexity of Data Reviewed  Labs: ordered. Decision-making details documented in ED Course.  Radiology: ordered. Decision-making details documented in ED Course.  ECG/medicine tests: ordered. Decision-making details documented in ED Course.    Risk  Prescription drug management.        Final diagnoses:   Hepatic steatosis   Viral gastroenteritis       ED Disposition  ED Disposition       ED Disposition   Discharge    Condition   Stable    Comment   --               Clotilde Hatfield, APRN  [address]  [facility] [address]  [phone]    In 2 days            Medication List        New Prescriptions      ondansetron ODT 4 MG disintegrating tablet  Commonly known as: ZOFRAN-ODT  Place 1 tablet on the tongue Every 8 (Eight) Hours As Needed for Nausea for up to 7 days.               Where to Get Your Medications        These medications were sent to [facility] [id number] - [place] [address] [facility] - [phone]  - [phone] FX  [address] [place] [address]      Phone: [phone]   ondansetron ODT 4 MG disintegrating tablet            Opal Loera PA  08/05/23 2765

## 2023-08-06 NOTE — DISCHARGE INSTRUCTIONS
Please take your zofran for nausea. Please utilize a bland diet and increase water intake. Please follow up with your PCP in 2 days or return to ER if symptoms worsen.

## 2023-08-10 LAB
BACTERIA SPEC AEROBE CULT: NORMAL
BACTERIA SPEC AEROBE CULT: NORMAL

## 2023-12-14 ENCOUNTER — OFFICE VISIT (OUTPATIENT)
Dept: GASTROENTEROLOGY | Facility: CLINIC | Age: 33
End: 2023-12-14
Payer: COMMERCIAL

## 2023-12-14 VITALS
HEART RATE: 71 BPM | DIASTOLIC BLOOD PRESSURE: 74 MMHG | SYSTOLIC BLOOD PRESSURE: 117 MMHG | HEIGHT: 74 IN | WEIGHT: 221 LBS | BODY MASS INDEX: 28.36 KG/M2

## 2023-12-14 DIAGNOSIS — K70.0 ALCOHOLIC FATTY LIVER: Primary | ICD-10-CM

## 2023-12-14 DIAGNOSIS — K59.04 CHRONIC IDIOPATHIC CONSTIPATION: ICD-10-CM

## 2023-12-14 DIAGNOSIS — J02.9 SORE THROAT: ICD-10-CM

## 2023-12-14 LAB
INR PPP: 0.99 (ref 0.9–1.1)
PROTHROMBIN TIME: 13.6 SECONDS (ref 12.1–14.7)

## 2023-12-14 PROCEDURE — 82103 ALPHA-1-ANTITRYPSIN TOTAL: CPT | Performed by: NURSE PRACTITIONER

## 2023-12-14 PROCEDURE — 83010 ASSAY OF HAPTOGLOBIN QUANT: CPT | Performed by: NURSE PRACTITIONER

## 2023-12-14 PROCEDURE — 85610 PROTHROMBIN TIME: CPT | Performed by: NURSE PRACTITIONER

## 2023-12-14 PROCEDURE — 82172 ASSAY OF APOLIPOPROTEIN: CPT | Performed by: NURSE PRACTITIONER

## 2023-12-14 PROCEDURE — 80074 ACUTE HEPATITIS PANEL: CPT | Performed by: NURSE PRACTITIONER

## 2023-12-14 PROCEDURE — 82390 ASSAY OF CERULOPLASMIN: CPT | Performed by: NURSE PRACTITIONER

## 2023-12-14 PROCEDURE — 82977 ASSAY OF GGT: CPT | Performed by: NURSE PRACTITIONER

## 2023-12-14 PROCEDURE — 82947 ASSAY GLUCOSE BLOOD QUANT: CPT | Performed by: NURSE PRACTITIONER

## 2023-12-14 PROCEDURE — 83540 ASSAY OF IRON: CPT | Performed by: NURSE PRACTITIONER

## 2023-12-14 PROCEDURE — 86038 ANTINUCLEAR ANTIBODIES: CPT | Performed by: NURSE PRACTITIONER

## 2023-12-14 PROCEDURE — 82728 ASSAY OF FERRITIN: CPT | Performed by: NURSE PRACTITIONER

## 2023-12-14 PROCEDURE — 86376 MICROSOMAL ANTIBODY EACH: CPT | Performed by: NURSE PRACTITIONER

## 2023-12-14 PROCEDURE — 84466 ASSAY OF TRANSFERRIN: CPT | Performed by: NURSE PRACTITIONER

## 2023-12-14 PROCEDURE — 80053 COMPREHEN METABOLIC PANEL: CPT | Performed by: NURSE PRACTITIONER

## 2023-12-14 PROCEDURE — 86381 MITOCHONDRIAL ANTIBODY EACH: CPT | Performed by: NURSE PRACTITIONER

## 2023-12-14 PROCEDURE — 86015 ACTIN ANTIBODY EACH: CPT | Performed by: NURSE PRACTITIONER

## 2023-12-14 PROCEDURE — 85025 COMPLETE CBC W/AUTO DIFF WBC: CPT | Performed by: NURSE PRACTITIONER

## 2023-12-14 PROCEDURE — 82465 ASSAY BLD/SERUM CHOLESTEROL: CPT | Performed by: NURSE PRACTITIONER

## 2023-12-14 PROCEDURE — 84478 ASSAY OF TRIGLYCERIDES: CPT | Performed by: NURSE PRACTITIONER

## 2023-12-14 PROCEDURE — 83883 ASSAY NEPHELOMETRY NOT SPEC: CPT | Performed by: NURSE PRACTITIONER

## 2023-12-14 PROCEDURE — 82247 BILIRUBIN TOTAL: CPT | Performed by: NURSE PRACTITIONER

## 2023-12-14 NOTE — PROGRESS NOTES
DATE OF CONSULTATION:  12/14/2023    REASON FOR REFERRAL: Lump in throat, sore throat    REFERRING PHYSICIAN:  MATT Marsh    CHIEF COMPLAINT:  Mild constipation, sore throat    HISTORY OF PRESENT ILLNESS:   Kendrick Fortune is a very pleasant 33 y.o. male who is being seen today at the request of MATT Marsh for evaluation and treatment of lump in patient's throat and sore throat. Mr. Fortune reports having what sounds like a tonsil stone on his left tonsil recently which was very sore. He reports his PCP gave him Rx for Z-jessy which he did not take. He was also referred to ENT but says he did not keep this appointment and is currently awaiting to have this rescheduled. He reports having history of chronic, heavy alcohol abuse since at least age 19. During this time, he had difficulty with GERD. However, he has been trying to cut back on drinking and is no longer having GERD flares. He says he was previously drinking daily. Most recently, he reports drinking ~2-3 times in the past four months. Of note, he had previous EGD with Dr. Ashraf in 2018 which was normal. He currently denies having dysphagia. Denies nausea or vomiting. He reports having mild, intermittent constipation which is controlled with Miralax as needed. He is currently having daily bowel movements with stool type 3-6 on BSS. He reports presenting to [facility] ED in August 2023 with abdominal pain. He underwent CT A/P in August 2023 which showed hepatic steatosis, otherwise unremarkable.He admits to consuming fatty diet and has history of alcohol abuse as above. He reports previous difficulty with pancreatitis secondary to alcohol abuse but has been doing well in this regard and pancreatitis was not noted on CT imaging. He has no other complaints today.     PAST MEDICAL HISTORY:  Past Medical History:   Diagnosis Date    Pancreatitis        PAST SURGICAL HISTORY:  Past Surgical History:   Procedure Laterality Date    ADENOIDECTOMY       "APPENDECTOMY      COLONOSCOPY      age 10- normal    ENDOSCOPY N/A 2/1/2018    Procedure: ESOPHAGOGASTRODUODENOSCOPY WITH BIOPSY CPT CODE: 87459;  Surgeon: Jeovany Ashraf III, MD;  Location: [facility] OR;  Service:     UPPER GASTROINTESTINAL ENDOSCOPY      age 10- normal       FAMILY HISTORY:  Family History   Problem Relation Age of Onset    Cancer Other     Ovarian cancer Mother     No Known Problems Father     No Known Problems Sister     Asthma Brother        SOCIAL HISTORY:  Social History     Socioeconomic History    Marital status: Single   Tobacco Use    Smoking status: Every Day     Types: Electronic Cigarette    Smokeless tobacco: Never   Substance and Sexual Activity    Alcohol use: No     Comment: occasional    Drug use: Defer    Sexual activity: Defer        MEDICATIONS:  The current medication list was reviewed in the EMR    Current Outpatient Medications:     polyethylene glycol (MIRALAX) powder, Take 17 g by mouth Every Other Day., Disp: 850 g, Rfl: 5    ALLERGIES:  No Known Allergies      REVIEW OF SYSTEMS:    A comprehensive 14 point review of systems was performed.  Significant findings as mentioned above.  All other systems reviewed and are negative.      Physical Exam   Vital Signs: /74 (BP Location: Left arm, Patient Position: Sitting, Cuff Size: Large Adult)   Pulse 71   Ht 188 cm (74\")   Wt 100 kg (221 lb)   BMI 28.37 kg/m²    General: Well developed, well nourished, alert and oriented x 3, in no acute distress.   Head: ATNC   Eyes: PERRL, No evidence of conjunctivitis.   Nose: No nasal discharge.   Mouth: Oral mucosal membranes moist. No oral ulceration or hemorrhages. No tonsil stones noted.   Neck: Neck supple. No thyromegaly. No JVD.   Lungs: Clear in all fields to A&P without rales, rhonchi or wheezing.   Heart: Regular rate and rhythm. No murmurs, rubs, or gallops.   Abdomen: Soft. Bowel sounds are normoactive. Nontender with palpation.  Extremities: No cyanosis or edema. "   Neurologic: Grossly non-focal exam      IMAGING:  FINDINGS:     Lung bases: Normal.     Liver: Hepatic steatosis.  No intrahepatic biliary ductal dilatation.     Gallbladder: Normal.      Spleen: Normal.     Adrenals: No nodules.     Kidneys: No hydroureteronephrosis.  Right upper pole cyst.     Pancreas: Parenchymal atrophy.  No main pancreatic duct dilation.     Urinary Bladder: Nondistended.     Reproductive: Unremarkable.     Bowel: The visualized small and large bowel are nondilated.  Apparent  prior appendectomy.  Colonic diverticulosis.       Mesentery/Peritoneum: Normal.     Vasculature: Normal, without aneurysm or significant atherosclerotic  disease.     Nodes: Scattered small abdominal nodes, none meeting CT criteria for  pathologic enlargement.     Bone windows: No aggressive osseous abnormalities.     Soft Tissues: Normal.     IMPRESSION:     Hepatic steatosis, in the setting of acute abdominal pain findings could  represent acute on chronic steatohepatitis.  Recommend correlation with  liver function testing.         This report was finalized on 8/5/2023 10:45 PM by Cesar Pineda MD.      RECENT LABS:  Lab Results   Component Value Date    WBC 10.16 08/05/2023    HGB 15.6 08/05/2023    HCT 44.3 08/05/2023    MCV 90.4 08/05/2023    RDW 12.4 08/05/2023     08/05/2023    NEUTRORELPCT 85.8 (H) 08/05/2023    LYMPHORELPCT 6.6 (L) 08/05/2023    MONORELPCT 6.5 08/05/2023    EOSRELPCT 0.6 08/05/2023    BASORELPCT 0.3 08/05/2023    NEUTROABS 8.72 (H) 08/05/2023    LYMPHSABS 0.67 (L) 08/05/2023       Lab Results   Component Value Date     08/05/2023    K 3.8 08/05/2023    CO2 23.5 08/05/2023     08/05/2023    BUN 12 08/05/2023    CREATININE 0.94 08/05/2023    EGFRIFNONA 92 12/18/2017    GLUCOSE 108 (H) 08/05/2023    CALCIUM 9.6 08/05/2023    ALKPHOS 50 08/05/2023    AST 44 (H) 08/05/2023     (H) 08/05/2023    BILITOT 1.0 08/05/2023    ALBUMIN 4.9 08/05/2023    PROTEINTOT 7.4  08/05/2023       ASSESSMENT & PLAN:  Kendrick Fortune is a very pleasant 33 y.o. male with    1.  Sore throat:  -Based on history above,  patient could of had previous tonsil stone; however, this was not noted on exam today. Patient is currently without dysphagia and has no concerning UGI symptoms. Therefore, EGD is not recommended. Advised patient to reschedule consultation with ENT if issue persists.     2. Fatty liver:  -This was noted on CT A/P from 8/5/23. He also has chronically elevated LFTs. Most recent CMP from 8/5/23 showed elevated  and AST 44 with normal Alk phosphatase and total bilirubin. His platelets were normal.   -As above, he has history of chronic, heavy alcohol abuse. Encouraged continued efforts to cut back/abstain from drinking.   -Also recommended tight control of the patient's blood sugar, cholesterol, triglycerides and weight to help prevent ongoing liver damage as much as possible. Discussed patient's potential risk factors and that, if not well controlled, hepatic steatosis could progress to cirrhosis.  -Discussed the importance of following a healthy low-fat diet and was encouraged to incorporate exercise into lifestyle.   -Will obtain further workup today including CBC, CMP, Ceruloplasmin, Anti smooth muscle Ab, Anti microsomal Ab, CYNTHIA, Alpha 1 Antitrypsin, PT/INR, BURTON fibrosure, Mitochondrial Ab, Iron panel, ferritin and acute hepatitis panel.   -Will have him RTC in 3 months. He will need ultrasound of the liver and hepatic panel every 6 months for monitoring.    3. Constipation:  -This has been a chronic issue. Offered trial of low dose Linzess. However, patient would like to continue with Miralax as needed which works well for him.     The patient was in agreement with the plan and all questions were answered to his satisfaction.     Thank you so much for allowing us to participate in the care of Kendrick Fortune . Please do not hesitate to contact us with any questions or concerns.              Electronically Signed by: MATT Mccormick , December 14, 2023 10:36 EST       CC:   MATT Marsh Melinda Mae, APRN

## 2023-12-15 LAB
ALBUMIN SERPL-MCNC: 5.2 G/DL (ref 3.5–5.2)
ALBUMIN/GLOB SERPL: 2.1 G/DL
ALP SERPL-CCNC: 42 U/L (ref 39–117)
ALPHA1 GLOB MFR UR ELPH: 136 MG/DL (ref 90–200)
ALT SERPL W P-5'-P-CCNC: 43 U/L (ref 1–41)
ANION GAP SERPL CALCULATED.3IONS-SCNC: 11.6 MMOL/L (ref 5–15)
AST SERPL-CCNC: 24 U/L (ref 1–40)
BASOPHILS # BLD AUTO: 0.02 10*3/MM3 (ref 0–0.2)
BASOPHILS NFR BLD AUTO: 0.3 % (ref 0–1.5)
BILIRUB SERPL-MCNC: 0.8 MG/DL (ref 0–1.2)
BUN SERPL-MCNC: 10 MG/DL (ref 6–20)
BUN/CREAT SERPL: 11.2 (ref 7–25)
CALCIUM SPEC-SCNC: 9.9 MG/DL (ref 8.6–10.5)
CERULOPLASMIN SERPL-MCNC: 18 MG/DL (ref 16–31)
CHLORIDE SERPL-SCNC: 101 MMOL/L (ref 98–107)
CO2 SERPL-SCNC: 26.4 MMOL/L (ref 22–29)
CREAT SERPL-MCNC: 0.89 MG/DL (ref 0.76–1.27)
DEPRECATED RDW RBC AUTO: 41.1 FL (ref 37–54)
EGFRCR SERPLBLD CKD-EPI 2021: 114.6 ML/MIN/1.73
EOSINOPHIL # BLD AUTO: 0.07 10*3/MM3 (ref 0–0.4)
EOSINOPHIL NFR BLD AUTO: 1.2 % (ref 0.3–6.2)
ERYTHROCYTE [DISTWIDTH] IN BLOOD BY AUTOMATED COUNT: 12.6 % (ref 12.3–15.4)
FERRITIN SERPL-MCNC: 421 NG/ML (ref 30–400)
GLOBULIN UR ELPH-MCNC: 2.5 GM/DL
GLUCOSE SERPL-MCNC: 109 MG/DL (ref 65–99)
HAV IGM SERPL QL IA: NORMAL
HBV CORE IGM SERPL QL IA: NORMAL
HBV SURFACE AG SERPL QL IA: NORMAL
HCT VFR BLD AUTO: 45.6 % (ref 37.5–51)
HCV AB SER DONR QL: NORMAL
HGB BLD-MCNC: 15.8 G/DL (ref 13–17.7)
IMM GRANULOCYTES # BLD AUTO: 0.01 10*3/MM3 (ref 0–0.05)
IMM GRANULOCYTES NFR BLD AUTO: 0.2 % (ref 0–0.5)
IRON 24H UR-MRATE: 173 MCG/DL (ref 59–158)
IRON SATN MFR SERPL: 43 % (ref 20–50)
LYMPHOCYTES # BLD AUTO: 1.41 10*3/MM3 (ref 0.7–3.1)
LYMPHOCYTES NFR BLD AUTO: 24.1 % (ref 19.6–45.3)
MCH RBC QN AUTO: 31 PG (ref 26.6–33)
MCHC RBC AUTO-ENTMCNC: 34.6 G/DL (ref 31.5–35.7)
MCV RBC AUTO: 89.4 FL (ref 79–97)
MONOCYTES # BLD AUTO: 0.34 10*3/MM3 (ref 0.1–0.9)
MONOCYTES NFR BLD AUTO: 5.8 % (ref 5–12)
NEUTROPHILS NFR BLD AUTO: 4 10*3/MM3 (ref 1.7–7)
NEUTROPHILS NFR BLD AUTO: 68.4 % (ref 42.7–76)
NRBC BLD AUTO-RTO: 0 /100 WBC (ref 0–0.2)
PLATELET # BLD AUTO: 243 10*3/MM3 (ref 140–450)
PMV BLD AUTO: 10.7 FL (ref 6–12)
POTASSIUM SERPL-SCNC: 4.4 MMOL/L (ref 3.5–5.2)
PROT SERPL-MCNC: 7.7 G/DL (ref 6–8.5)
RBC # BLD AUTO: 5.1 10*6/MM3 (ref 4.14–5.8)
SODIUM SERPL-SCNC: 139 MMOL/L (ref 136–145)
TIBC SERPL-MCNC: 399 MCG/DL (ref 298–536)
TRANSFERRIN SERPL-MCNC: 268 MG/DL (ref 200–360)
WBC NRBC COR # BLD AUTO: 5.85 10*3/MM3 (ref 3.4–10.8)

## 2023-12-16 LAB
MITOCHONDRIA M2 IGG SER-ACNC: <20 UNITS (ref 0–20)
SMA IGG SER-ACNC: 3 UNITS (ref 0–19)

## 2023-12-17 LAB
A2 MACROGLOB SERPL-MCNC: 120 MG/DL (ref 110–276)
ALT SERPL W P-5'-P-CCNC: 43 IU/L (ref 0–55)
APO A-I SERPL-MCNC: 112 MG/DL (ref 101–178)
AST SERPL W P-5'-P-CCNC: 21 IU/L (ref 0–40)
BILIRUB SERPL-MCNC: 0.3 MG/DL (ref 0–1.2)
CHOLEST SERPL-MCNC: 195 MG/DL (ref 100–199)
FIBROSIS SCORING:: ABNORMAL
FIBROSIS STAGE SERPL QL: ABNORMAL
GGT SERPL-CCNC: 41 IU/L (ref 0–65)
GLUCOSE SERPL-MCNC: 98 MG/DL (ref 70–99)
HAPTOGLOB SERPL-MCNC: 98 MG/DL (ref 17–317)
LABORATORY COMMENT REPORT: ABNORMAL
LIVER FIBR SCORE SERPL CALC.FIBROSURE: 0.09 (ref 0–0.21)
LIVER STEATOSIS GRADE SERPL QL: ABNORMAL
LIVER STEATOSIS SCORE SERPL: 0.63 (ref 0–0.4)
NASH GRADE SERPL QL: ABNORMAL
NASH INTERPRETATION SERPL-IMP: ABNORMAL
NASH SCORE SERPL: 0.24 (ref 0–0.25)
NASH SCORING: ABNORMAL
STEATOSIS SCORING: ABNORMAL
TEST PERFORMANCE INFO SPEC: ABNORMAL
TEST PERFORMANCE INFO SPEC: ABNORMAL
TRIGL SERPL-MCNC: 182 MG/DL (ref 0–149)

## 2023-12-18 LAB
ANA SER QL: NEGATIVE
LKM-1 AB SER-ACNC: 1.1 UNITS (ref 0–20)

## 2023-12-21 ENCOUNTER — TELEPHONE (OUTPATIENT)
Dept: GASTROENTEROLOGY | Facility: CLINIC | Age: 33
End: 2023-12-21
Payer: COMMERCIAL

## 2023-12-21 NOTE — TELEPHONE ENCOUNTER
Please notify Mr. Fortune that workup from 12/14 showed his liver function tests had improved. He had mildly elevated ALT with normalized AST, Alk phos and total bilirubin. CBC showed normal blood counts. BURTON fibrosure showed moderate fatty liver with no evidence of cirrhosis. Please remind him to abstain from drinking and importance of following healthy, low fat diet. The remaining workup was unremarkable. Follow up as scheduled.

## 2023-12-22 NOTE — TELEPHONE ENCOUNTER
I spoke to patient, notified him that per Leilani, Please notify Mr. Fortune that workup from 12/14 showed his liver function tests had improved. He had mildly elevated ALT with normalized AST, Alk phos and total bilirubin. CBC showed normal blood counts. BURTON fibrosure showed moderate fatty liver with no evidence of cirrhosis. Please remind him to abstain from drinking and importance of following healthy, low fat diet. The remaining workup was unremarkable. Follow up as scheduled. Patient verbalized understanding.

## (undated) DEVICE — THE BITE BLOCK MAXI, LATEX FREE STRAP IS USED TO PROTECT THE ENDOSCOPE INSERTION TUBE FROM BEING BITTEN BY THE PATIENT.

## (undated) DEVICE — TUBING, SUCTION, 1/4" X 20', STRAIGHT: Brand: MEDLINE INDUSTRIES, INC.

## (undated) DEVICE — Device

## (undated) DEVICE — GOWN,REINF,POLY,ECL,PP SLV,XL: Brand: MEDLINE

## (undated) DEVICE — Device: Brand: DEFENDO AIR/WATER/SUCTION AND BIOPSY VALVE

## (undated) DEVICE — SYR LUERLOK 30CC

## (undated) DEVICE — FRCP BX RADJAW4 NDL 2.8 240CM LG OG BX40

## (undated) DEVICE — SINGLE PORT MANIFOLD: Brand: NEPTUNE 2